# Patient Record
Sex: FEMALE | Race: WHITE | NOT HISPANIC OR LATINO | Employment: UNEMPLOYED | URBAN - METROPOLITAN AREA
[De-identification: names, ages, dates, MRNs, and addresses within clinical notes are randomized per-mention and may not be internally consistent; named-entity substitution may affect disease eponyms.]

---

## 2023-10-05 ENCOUNTER — PREP FOR PROCEDURE (OUTPATIENT)
Dept: SURGERY | Facility: HOSPITAL | Age: 38
End: 2023-10-05
Payer: COMMERCIAL

## 2023-10-05 DIAGNOSIS — Q35.9 CLEFT PALATE (HHS-HCC): Primary | ICD-10-CM

## 2023-10-05 DIAGNOSIS — K13.79 VELOPHARYNGEAL INSUFFICIENCY, ACQUIRED: ICD-10-CM

## 2023-10-18 ENCOUNTER — OFFICE VISIT (OUTPATIENT)
Dept: PLASTIC SURGERY | Facility: HOSPITAL | Age: 38
End: 2023-10-18
Payer: COMMERCIAL

## 2023-10-18 ENCOUNTER — MULTIDISCIPLINARY VISIT (OUTPATIENT)
Dept: SPEECH THERAPY | Facility: HOSPITAL | Age: 38
End: 2023-10-18
Payer: COMMERCIAL

## 2023-10-18 VITALS
HEIGHT: 64 IN | SYSTOLIC BLOOD PRESSURE: 140 MMHG | RESPIRATION RATE: 20 BRPM | HEART RATE: 57 BPM | TEMPERATURE: 98 F | WEIGHT: 218.92 LBS | BODY MASS INDEX: 37.37 KG/M2 | DIASTOLIC BLOOD PRESSURE: 85 MMHG

## 2023-10-18 DIAGNOSIS — R49.21 HYPERNASAL SPEECH: ICD-10-CM

## 2023-10-18 DIAGNOSIS — Q35.9 CLEFT PALATE (HHS-HCC): ICD-10-CM

## 2023-10-18 DIAGNOSIS — K13.79 VELOPHARYNGEAL INSUFFICIENCY, ACQUIRED: Primary | ICD-10-CM

## 2023-10-18 DIAGNOSIS — K13.79: Primary | ICD-10-CM

## 2023-10-18 PROCEDURE — 99213 OFFICE O/P EST LOW 20 MIN: CPT | Performed by: SURGERY

## 2023-10-18 PROCEDURE — 92524 BEHAVRAL QUALIT ANALYS VOICE: CPT | Mod: GN | Performed by: SPEECH-LANGUAGE PATHOLOGIST

## 2023-10-18 ASSESSMENT — PAIN SCALES - GENERAL: PAINLEVEL_OUTOF10: 0 - NO PAIN

## 2023-10-18 ASSESSMENT — PAIN - FUNCTIONAL ASSESSMENT: PAIN_FUNCTIONAL_ASSESSMENT: 0-10

## 2023-10-18 NOTE — Clinical Note
October 20, 2023     Patient: Sadie Bocanegra   YOB: 1985   Date of Visit: 10/18/2023       To Whom it May Concern:    Sadie Bocanegra was seen in my clinic on 10/18/2023. She {Return to school/sport:19545}.    If you have any questions or concerns, please don't hesitate to call.         Sincerely,          Tessa Bansal, CCC-SLP        CC: No Recipients

## 2023-10-18 NOTE — PROGRESS NOTES
Speech-Language Pathology    SLP ADULT Outpatient Speech-Language Cognition    Patient Name: Sadie Bocanegra  MRN: [id number]  Today's Date: 10/20/2023      Time Calculation  Start Time: 1345  Stop Time: 1405  Time Calculation (min): 20 min      Current Problem:   1. Acquired velopharyngeal incompetence        2. Hypernasal speech              SLP Assessment:  SLP Assessment  SLP Assessment Results: Other (Comment) (Pt with significant VPI receiving a VPI score of 9)  Strengths: Motivation  Education Provided: Yes      SLP Plan:  Plan  Discussed POC: Patient  Discussed Risks/Benefits: Yes, Patient  Patient/Caregiver Agreeable: Yes      Subjective   Pt is eager for surgery to provide at least some relief from her hypernasality.     Most Recent Visit:  SLP Most Recent Visit  SLP Received On: 10/18/23      General Visit Information:  General Information  Reason for Referral: Pt with acquired VPI with large soft palate defect.  Referred By: Dr Naylor  Past Medical History Relevant to Rehab: Pt is a 37 year old female with acquired VPI secondary to recent large soft palate defect.      Objective       Pain:  Pain Assessment  Pain Assessment: 0-10  Pain Score: 0 - No pain      Resonance:   Pt presented with a VPI score of 9 which is indicative of an incompetent valving mechanism. A score of 9 was given for significantly reduced intraoral air pressure, inconsistent audible nasal emissions, and moderate hypernasality. Discussed given the large size of her soft palate defect she may not get full resolution but likely will get some and is a great candidate given her speech is still intact. Pt interested in surgery and will be speaking with Dr Naylor after this visit. Recommend follow up with SLP after surgery.     Outpatient Education:  Adult Outpatient Education  Individual(s) Educated: Patient  Verbal Education : VPI, limitations of surgery, recommendations  Diagnosis and Precautions: Incompetent Valving Mechanism- Significant  VPI  Risk and Benefits Discussed with Patient/Caregiver/Other: yes  Patient/Caregiver Demonstrated Understanding: yes  Patient Response to Education: Patient/Caregiver Verbalized Understanding of Information, Patient/Caregiver Asked Appropriate Questions

## 2023-10-18 NOTE — PROGRESS NOTES
Clinic Note    Reason For Visit  Soft palate defect    History Of Present Illness  Sadie Bocanegra is a 37 y.o. female presenting with large soft palate defect and velopharyngeal insufficiency.  As seen this patient about 1 month ago she reports that this defect has been present for over 1 year.  She has been seen by multiple other providers and denies any inciting trauma.  She has not been able to work due to the significant hypernasality and inability for other people to understand her.     Past Medical History  She has no past medical history on file.  Anxiety, right hip degenerative disease, hyperlipidemia    Surgical History  She has no past surgical history on file.  Right hip replacement, bilateral carpal tunnel release     Social History  She reports that she has been smoking cigarettes. She has never used smokeless tobacco. No history on file for alcohol use and drug use.  She smokes half pack of cigarettes per day  She denies any alcohol use or illicit drug use other than marijuana  She is currently unemployed but used to work as a     Allergies  Gabapentin, Latex, and Toradol [ketorolac]    Review of Systems  Negative other than what is included in the HPI.      Physical Exam  On exam, Sadie Bocanegra is well-appearing and well-developed.  she is breathing comfortably on room air and is in no distress.  Focused examination of Her affected region reveals: Large soft palate defect and tissue loss.  Significant hypernasality and velopharyngeal insufficiency.     Relevant Results      Assessment/Plan     Sadie Bocanegra is a 37 y.o. female with large soft palate defect resulting hypernasality, nasal regurgitation.  This is very symptomatic especially in terms of her unintelligible speech.  This has precluded her from being able to work as a  and she is very interested in palatal reconstruction to improve her speech.  We went over the options for surgical repair and again emphasized  that we will need to to transfer tissue from the buccal region in order to reconstruct the soft palate.  I did discuss again discussed with her that she will likely still have some residual speech abnormalities afterwards requiring speech therapy and possible obturator.  Furthermore I discussed that she will need to stop smoking for at least 30 days prior to surgery to improve the chance of healing.  We will need to obtain a cotton test prior to surgery to confirm smoking cessation.    She was evaluated by our team speech-language pathologist today and found to have severe velopharyngeal insufficiency.  Given the limitation of her speech and difficulty with others understanding her, I do think that she is unable to work at this time.    I spent 20 minutes in the professional and overall care of this patient.      Matt Naylor MD

## 2023-10-18 NOTE — PATIENT INSTRUCTIONS
Schedule surgery    Stop smoking    CPM visit and preoperative labs    Call or return sooner if there are questions or concerns

## 2023-10-18 NOTE — Clinical Note
October 20, 2023     Patient: Sadie Bocanegra   YOB: 1985   Date of Visit: 10/18/2023       To Whom It May Concern:    It is my medical opinion that Sadie Bocanegra {Work release (duty restriction):65209}.    If you have any questions or concerns, please don't hesitate to call.         Sincerely,        Tessa Bansal, CCC-SLP    CC: No Recipients

## 2023-10-20 PROBLEM — R49.21 HYPERNASAL SPEECH: Status: ACTIVE | Noted: 2023-10-20

## 2023-10-20 PROBLEM — Q38.8 VELOPHARYNGEAL INSUFFICIENCY (VPI), CONGENITAL: Status: ACTIVE | Noted: 2023-10-20

## 2023-10-20 PROBLEM — K13.79: Status: ACTIVE | Noted: 2023-10-20

## 2023-10-23 DIAGNOSIS — R49.21 HYPERNASAL SPEECH: Primary | ICD-10-CM

## 2023-10-23 DIAGNOSIS — K13.79: ICD-10-CM

## 2023-10-23 PROBLEM — Q35.9 CLEFT PALATE (HHS-HCC): Status: ACTIVE | Noted: 2023-10-05

## 2023-10-30 DIAGNOSIS — K13.79 VELOPHARYNGEAL INSUFFICIENCY, ACQUIRED: ICD-10-CM

## 2023-10-30 DIAGNOSIS — Q35.9 CLEFT PALATE (HHS-HCC): ICD-10-CM

## 2023-10-30 DIAGNOSIS — R49.21 HYPERNASAL SPEECH: ICD-10-CM

## 2023-11-21 ENCOUNTER — PRE-ADMISSION TESTING (OUTPATIENT)
Dept: PREADMISSION TESTING | Facility: HOSPITAL | Age: 38
End: 2023-11-21
Payer: COMMERCIAL

## 2023-11-21 VITALS
SYSTOLIC BLOOD PRESSURE: 133 MMHG | OXYGEN SATURATION: 98 % | HEART RATE: 62 BPM | TEMPERATURE: 96.7 F | DIASTOLIC BLOOD PRESSURE: 84 MMHG | WEIGHT: 220.6 LBS | HEIGHT: 64 IN | BODY MASS INDEX: 37.66 KG/M2

## 2023-11-21 DIAGNOSIS — K13.79 VELOPHARYNGEAL INSUFFICIENCY, ACQUIRED: ICD-10-CM

## 2023-11-21 DIAGNOSIS — R49.21 HYPERNASAL SPEECH: ICD-10-CM

## 2023-11-21 DIAGNOSIS — Q35.9 CLEFT PALATE (HHS-HCC): ICD-10-CM

## 2023-11-21 LAB
ABO GROUP (TYPE) IN BLOOD: NORMAL
ALBUMIN SERPL BCP-MCNC: 4.4 G/DL (ref 3.4–5)
ALP SERPL-CCNC: 57 U/L (ref 33–110)
ALT SERPL W P-5'-P-CCNC: 5 U/L (ref 7–45)
ANION GAP SERPL CALC-SCNC: 13 MMOL/L (ref 10–20)
ANTIBODY SCREEN: NORMAL
APTT PPP: 30 SECONDS (ref 27–38)
AST SERPL W P-5'-P-CCNC: 9 U/L (ref 9–39)
BILIRUB SERPL-MCNC: 0.3 MG/DL (ref 0–1.2)
BUN SERPL-MCNC: 12 MG/DL (ref 6–23)
CALCIUM SERPL-MCNC: 9.3 MG/DL (ref 8.6–10.6)
CHLORIDE SERPL-SCNC: 105 MMOL/L (ref 98–107)
CO2 SERPL-SCNC: 26 MMOL/L (ref 21–32)
CREAT SERPL-MCNC: 0.69 MG/DL (ref 0.5–1.05)
ERYTHROCYTE [DISTWIDTH] IN BLOOD BY AUTOMATED COUNT: 14.3 % (ref 11.5–14.5)
GFR SERPL CREATININE-BSD FRML MDRD: >90 ML/MIN/1.73M*2
GLUCOSE SERPL-MCNC: 86 MG/DL (ref 74–99)
HCT VFR BLD AUTO: 38.1 % (ref 36–46)
HGB BLD-MCNC: 12.4 G/DL (ref 12–16)
INR PPP: 1 (ref 0.9–1.1)
MCH RBC QN AUTO: 30.1 PG (ref 26–34)
MCHC RBC AUTO-ENTMCNC: 32.5 G/DL (ref 32–36)
MCV RBC AUTO: 93 FL (ref 80–100)
NRBC BLD-RTO: 0 /100 WBCS (ref 0–0)
PLATELET # BLD AUTO: 270 X10*3/UL (ref 150–450)
POTASSIUM SERPL-SCNC: 4.2 MMOL/L (ref 3.5–5.3)
PROT SERPL-MCNC: 6.8 G/DL (ref 6.4–8.2)
PROTHROMBIN TIME: 11.1 SECONDS (ref 9.8–12.8)
RBC # BLD AUTO: 4.12 X10*6/UL (ref 4–5.2)
RH FACTOR (ANTIGEN D): NORMAL
SODIUM SERPL-SCNC: 140 MMOL/L (ref 136–145)
WBC # BLD AUTO: 9.4 X10*3/UL (ref 4.4–11.3)

## 2023-11-21 PROCEDURE — 36415 COLL VENOUS BLD VENIPUNCTURE: CPT

## 2023-11-21 PROCEDURE — 85610 PROTHROMBIN TIME: CPT

## 2023-11-21 PROCEDURE — 85730 THROMBOPLASTIN TIME PARTIAL: CPT

## 2023-11-21 PROCEDURE — 86900 BLOOD TYPING SEROLOGIC ABO: CPT

## 2023-11-21 PROCEDURE — 80053 COMPREHEN METABOLIC PANEL: CPT

## 2023-11-21 PROCEDURE — 99204 OFFICE O/P NEW MOD 45 MIN: CPT | Performed by: NURSE PRACTITIONER

## 2023-11-21 PROCEDURE — 80323 ALKALOIDS NOS: CPT

## 2023-11-21 PROCEDURE — 85027 COMPLETE CBC AUTOMATED: CPT

## 2023-11-21 RX ORDER — NORETHINDRONE ACETATE AND ETHINYL ESTRADIOL .02; 1 MG/1; MG/1
1 TABLET ORAL DAILY
Status: ON HOLD | COMMUNITY
Start: 2023-04-13 | End: 2023-12-01 | Stop reason: DRUGHIGH

## 2023-11-21 RX ORDER — METHADONE HYDROCHLORIDE 5 MG/1
5 TABLET ORAL 4 TIMES DAILY
COMMUNITY

## 2023-11-21 RX ORDER — OXYCODONE AND ACETAMINOPHEN 5; 325 MG/1; MG/1
1 TABLET ORAL 4 TIMES DAILY
COMMUNITY

## 2023-11-21 RX ORDER — VILAZODONE HYDROCHLORIDE 40 MG/1
40 TABLET ORAL DAILY
COMMUNITY

## 2023-11-21 RX ORDER — ZOLPIDEM TARTRATE 5 MG/1
5 TABLET ORAL NIGHTLY PRN
COMMUNITY

## 2023-11-21 RX ORDER — ALPRAZOLAM 1 MG/1
1 TABLET ORAL NIGHTLY PRN
Status: ON HOLD | COMMUNITY
End: 2023-12-01 | Stop reason: DRUGHIGH

## 2023-11-21 RX ORDER — VALACYCLOVIR HYDROCHLORIDE 1 G/1
1000 TABLET, FILM COATED ORAL DAILY
COMMUNITY

## 2023-11-21 RX ORDER — CLONAZEPAM 1 MG/1
1 TABLET ORAL 3 TIMES DAILY
COMMUNITY

## 2023-11-21 RX ORDER — ATORVASTATIN CALCIUM 10 MG/1
10 TABLET, FILM COATED ORAL NIGHTLY
COMMUNITY

## 2023-11-21 ASSESSMENT — ENCOUNTER SYMPTOMS
NEUROLOGICAL NEGATIVE: 1
CONSTITUTIONAL NEGATIVE: 1
TROUBLE SWALLOWING: 1
MUSCULOSKELETAL NEGATIVE: 1
GASTROINTESTINAL NEGATIVE: 1
RESPIRATORY NEGATIVE: 1
VOICE CHANGE: 1
EYES NEGATIVE: 1
CARDIOVASCULAR NEGATIVE: 1
ENDOCRINE NEGATIVE: 1

## 2023-11-21 ASSESSMENT — DUKE ACTIVITY SCORE INDEX (DASI)
CAN YOU DO MODERATE WORK AROUND THE HOUSE LIKE VACUUMING, SWEEPING FLOORS OR CARRYING GROCERIES: YES
CAN YOU WALK A BLOCK OR TWO ON LEVEL GROUND: YES
CAN YOU RUN A SHORT DISTANCE: YES
CAN YOU PARTICIPATE IN MODERATE RECREATIONAL ACTIVITIES LIKE GOLF, BOWLING, DANCING, DOUBLES TENNIS OR THROWING A BASEBALL OR FOOTBALL: YES
CAN YOU HAVE SEXUAL RELATIONS: YES
DASI METS SCORE: 9.9
CAN YOU DO YARD WORK LIKE RAKING LEAVES, WEEDING OR PUSHING A MOWER: YES
CAN YOU PARTICIPATE IN STRENOUS SPORTS LIKE SWIMMING, SINGLES TENNIS, FOOTBALL, BASKETBALL, OR SKIING: YES
TOTAL_SCORE: 58.2
CAN YOU WALK INDOORS, SUCH AS AROUND YOUR HOUSE: YES
CAN YOU TAKE CARE OF YOURSELF (EAT, DRESS, BATHE, OR USE TOILET): YES
CAN YOU CLIMB A FLIGHT OF STAIRS OR WALK UP A HILL: YES
CAN YOU DO LIGHT WORK AROUND THE HOUSE LIKE DUSTING OR WASHING DISHES: YES
CAN YOU DO HEAVY WORK AROUND THE HOUSE LIKE SCRUBBING FLOORS OR LIFTING AND MOVING HEAVY FURNITURE: YES

## 2023-11-21 ASSESSMENT — LIFESTYLE VARIABLES: SMOKING_STATUS: NONSMOKER

## 2023-11-21 NOTE — CPM/PAT H&P
CPM/PAT Evaluation       Name: Sadie Bocanegra (Sadie Bocanegra)  /Age: 12/15//37 y.o.     Visit Type:   In-Person       Chief Complaint: cleft palate scheduled for surgery    HPI: Patient is a 37-year-old female scheduled for repair of cleft palate on 2023.  The patient is referred by Dr. Matt Naylor for preoperative evaluation of anxiety, depression, herpes managed on Valtrex, hyperlipidemia managed on atorvastatin, velopharyngeal insufficiency acquired, cleft palate, osteoarthritis status post left hip arthroplasty.    Past Medical History:   Diagnosis Date    Anxiety     Cleft palate     Herpes     Hyperlipidemia     Velopharyngeal insufficiency, acquired        Past Surgical History:   Procedure Laterality Date    CARPAL TUNNEL RELEASE Bilateral 2019    CERVICAL BIOPSY      HIP ARTHROPLASTY Left 2022    LYMPH NODE BIOPSY      left axillary - found to be blocked sweat gland       Patient  has no history on file for sexual activity.    Family History   Problem Relation Name Age of Onset    No Known Problems Mother      Lung cancer Maternal Grandmother      Brain cancer Maternal Grandmother      Uterine cancer Maternal Grandmother      Lung cancer Maternal Grandfather         Allergies   Allergen Reactions    Gabapentin GI Upset    Latex Other    Toradol [Ketorolac] GI Upset       Prior to Admission medications    Medication Sig Start Date End Date Taking? Authorizing Provider   norethindrone ac-eth estradioL (Microgestin ) 1-20 mg-mcg tablet Take 1 tablet by mouth once daily. 23  Yes Historical Provider, MD   ALPRAZolam (Xanax) 1 mg tablet Take 1 tablet (1 mg) by mouth as needed at bedtime for anxiety.    Historical Provider, MD   atorvastatin (Lipitor) 10 mg tablet Take 1 tablet (10 mg) by mouth once daily at bedtime.    Historical Provider, MD   clonazePAM (KlonoPIN) 1 mg tablet Take 1 tablet (1 mg) by mouth 3 times a day.    Historical Provider, MD   methadone (Dolophine) 5 mg  tablet Take 1 tablet (5 mg) by mouth 4 times a day.    Historical Provider, MD   oxyCODONE-acetaminophen (Percocet) 5-325 mg tablet Take 1 tablet by mouth 4 times a day.    Historical Provider, MD   valACYclovir (Valtrex) 1 gram tablet Take 1 tablet (1,000 mg) by mouth once daily.    Historical Provider, MD   vilazodone (Viibryd) 40 mg tablet Take 1 tablet (40 mg) by mouth once daily.    Historical Provider, MD   zolpidem (Ambien) 5 mg tablet Take 1 tablet (5 mg) by mouth as needed at bedtime for sleep.    Historical Provider, MD JACKSON ROS:   Constitutional:   neg    Neuro/Psych:   neg    Eyes:   neg     use of corrective lenses  Ears:   Nose:   neg    Mouth:   neg    Throat:    dysphagia   voice change  Neck:   Cardio:   neg    Respiratory:   neg    Endocrine:   neg    GI:   neg    :   neg    Musculoskeletal:   neg    Hematologic:   neg    Skin:  neg        Physical Exam  Vitals reviewed.   Constitutional:       Appearance: Normal appearance. She is obese.   HENT:      Head: Normocephalic.      Mouth/Throat:      Mouth: Mucous membranes are dry.   Eyes:      Conjunctiva/sclera: Conjunctivae normal.   Neck:      Vascular: No carotid bruit.   Cardiovascular:      Rate and Rhythm: Normal rate and regular rhythm.      Pulses: Normal pulses.      Heart sounds: Normal heart sounds.   Pulmonary:      Effort: Pulmonary effort is normal.      Breath sounds: Normal breath sounds.   Abdominal:      Palpations: Abdomen is soft.      Tenderness: There is no abdominal tenderness.   Musculoskeletal:         General: Normal range of motion.      Cervical back: Normal range of motion.      Right lower leg: No edema.      Left lower leg: No edema.   Lymphadenopathy:      Cervical: No cervical adenopathy.   Skin:     General: Skin is warm and dry.      Capillary Refill: Capillary refill takes less than 2 seconds.   Neurological:      General: No focal deficit present.      Mental Status: She is alert and oriented to person,  place, and time.   Psychiatric:         Mood and Affect: Mood normal.         Behavior: Behavior normal.         Thought Content: Thought content normal.         Judgment: Judgment normal.          PAT AIRWAY:   Airway:     Mallampati::  II    Neck ROM::  Full  normal        Visit Vitals  /84   Pulse 62   Temp 35.9 °C (96.7 °F)       DASI Risk Score      Flowsheet Row Most Recent Value   DASI SCORE 58.2   METS Score (Will be calculated only when all the questions are answered) 9.9          Caprini DVT Assessment      Flowsheet Row Most Recent Value   DVT Score 10   Current Status Major surgery planned, lasting over 3 hours   Women Oral contraceptives   History Prior major surgery   Age Less than 40 years   BMI 31-40 (Obesity)          Modified Frailty Index      Flowsheet Row Most Recent Value   Modified Frailty Index Calculator 0          CHADS2 Stroke Risk  Current as of 3 minutes ago        N/A 3 - 100%: High Risk   2 - 3%: Medium Risk   0 - 2%: Low Risk     Last Change: N/A          This score determines the patient's risk of having a stroke if the patient has atrial fibrillation.        This score is not applicable to this patient. Components are not calculated.          Revised Cardiac Risk Index      Flowsheet Row Most Recent Value   Revised Cardiac Risk Calculator 0          Apfel Simplified Score      Flowsheet Row Most Recent Value   Apfel Simplified Score Calculator 3          Risk Analysis Index Results This Encounter    No data found in the last 1 encounters.       Stop Bang Score      Flowsheet Row Most Recent Value   Do you snore loudly? 1   Do you often feel tired or fatigued after your sleep? 1   Has anyone ever observed you stop breathing in your sleep? 0   Do you have or are you being treated for high blood pressure? 0   Recent BMI (Calculated) 37.1   Is BMI greater than 35 kg/m2? 1=Yes   Age older than 50 years old? 0=No   Is your neck circumference greater than 17 inches (Male) or 16  inches (Female)? 0   Gender - Male 0=No   STOP-BANG Total Score 3            Assessment and Plan:   Neuro:  anxiety and depression managed with medications and non-medicinal therapies. The patient is at an increased risk for post operative delirium secondary to depression and type and duration of surgery.  Preoperative brain exercise educational handout provided to patient.    The patient is at an increased risk for perioperative stroke secondary to female sex and general anesthesia with op time >2.5 hours.     HEENT/Airway:  velopharyngeal insufficiency acquired, cleft palate scheduled for surgery    Cardiovascular:  hyperlipidemia managed on atorvastatin. No additional preoperative testing is currently indicated.    METS are 9.9    RCRI  0 which is 3.9% 30 day risk of MACE (risk for cardiac death, nonfatal myocardial infarction, and nonfactal cardiac arrest    LIZ score which indicates a 0.1% risk of intraoperative or 30-day postoperative MACE      Pulmonary:  recent cessation of tobacco abuse.  Preoperative deep breathing exercise educational handout provided to patient.    ARISCAT:  16  points which is a  low risk of in-hospital post-op pulmonary complications     PRODIGY:  0  points which is a low  risk of post op opioid induced respiratory depression episodes    STOP BANG:  3 points which is an intermediate risk for moderate to severe AMBROCIO. Patient to speak to PCP for work up and possible referral closer to home post op.    Renal: No diagnosis or significant findings on chart review or clinical presentation and evaluation.     Endocrine:  No diagnosis or significant findings on chart review or clinical presentation and evaluation.     Hematologic:   No diagnosis or significant findings on chart review or clinical presentation and evaluation.  Preoperative DVT educational handout provided to patient.    Caprini Score:   10 points which is a highest risk of perioperative VTE    Gastrointestinal:   Dysphagia  related to cleft palate    EAT-10 score of 40 - self-perceived oropharyngeal dysphagia scale (0-40)     Apfel:  3  points 61% risk for post operative N/V    Infectious disease:  herpes managed on Valtrex with no recent outbreaks.     Musculoskeletal:  osteoarthritis status post left hip arthroplasty       Labs ordered  Results for orders placed or performed in visit on 11/21/23 (from the past 96 hour(s))   Type And Screen   Result Value Ref Range    ABO TYPE A     Rh TYPE POS     ANTIBODY SCREEN NEG    Coagulation Screen   Result Value Ref Range    Protime 11.1 9.8 - 12.8 seconds    INR 1.0 0.9 - 1.1    aPTT 30 27 - 38 seconds   CBC   Result Value Ref Range    WBC 9.4 4.4 - 11.3 x10*3/uL    nRBC 0.0 0.0 - 0.0 /100 WBCs    RBC 4.12 4.00 - 5.20 x10*6/uL    Hemoglobin 12.4 12.0 - 16.0 g/dL    Hematocrit 38.1 36.0 - 46.0 %    MCV 93 80 - 100 fL    MCH 30.1 26.0 - 34.0 pg    MCHC 32.5 32.0 - 36.0 g/dL    RDW 14.3 11.5 - 14.5 %    Platelets 270 150 - 450 x10*3/uL   Comprehensive Metabolic Panel   Result Value Ref Range    Glucose 86 74 - 99 mg/dL    Sodium 140 136 - 145 mmol/L    Potassium 4.2 3.5 - 5.3 mmol/L    Chloride 105 98 - 107 mmol/L    Bicarbonate 26 21 - 32 mmol/L    Anion Gap 13 10 - 20 mmol/L    Urea Nitrogen 12 6 - 23 mg/dL    Creatinine 0.69 0.50 - 1.05 mg/dL    eGFR >90 >60 mL/min/1.73m*2    Calcium 9.3 8.6 - 10.6 mg/dL    Albumin 4.4 3.4 - 5.0 g/dL    Alkaline Phosphatase 57 33 - 110 U/L    Total Protein 6.8 6.4 - 8.2 g/dL    AST 9 9 - 39 U/L    Bilirubin, Total 0.3 0.0 - 1.2 mg/dL    ALT 5 (L) 7 - 45 U/L      serum nicotine- 11/21/23 negative

## 2023-11-21 NOTE — PREPROCEDURE INSTRUCTIONS
NPO Instructions:    Do not eat any food after midnight the night before your surgery/procedure.  You may have 10 ounces of clear liquids until TWO hours before surgery/procedure. This includes water, black tea/coffee, (no milk or cream) apple juice and electrolyte drinks (Gatorade).  You may chew gum up to TWO hours before your surgery/procedure.    Additional Instructions:     The Day before Surgery:  Review your medication instructions, stop indicated medications  You will be contacted in the evening regarding the time of your arrival to facility and surgery time    Day of Surgery:  Review your medication instructions, take indicated medications  Wear  comfortable loose fitting clothing  Do not use moisturizers, creams, lotions or perfume  All jewelry and valuables should be left at home    Samantha Meeson, MSN, NP-C  Adult-Gerontology Nurse Practitioner II  Department of Anesthesiology and Perioperative Medicine  Main phone [phone]  Direct phone [phone]  Fax [phone]

## 2023-11-25 LAB
ANABASINE UR-MCNC: <5 NG/ML
COTININE UR-MCNC: <15 NG/ML
NICOTINE UR-MCNC: <15 NG/ML
TRANS-3-OH-COTININE UR-MCNC: <50 NG/ML

## 2023-12-01 ENCOUNTER — ANESTHESIA EVENT (OUTPATIENT)
Dept: OPERATING ROOM | Facility: HOSPITAL | Age: 38
End: 2023-12-01
Payer: COMMERCIAL

## 2023-12-01 ENCOUNTER — HOSPITAL ENCOUNTER (OUTPATIENT)
Facility: HOSPITAL | Age: 38
Discharge: HOME | End: 2023-12-02
Attending: SURGERY | Admitting: SURGERY
Payer: COMMERCIAL

## 2023-12-01 ENCOUNTER — ANESTHESIA (OUTPATIENT)
Dept: OPERATING ROOM | Facility: HOSPITAL | Age: 38
End: 2023-12-01
Payer: COMMERCIAL

## 2023-12-01 DIAGNOSIS — Q35.9 CLEFT PALATE (HHS-HCC): Primary | ICD-10-CM

## 2023-12-01 DIAGNOSIS — K13.79 VELOPHARYNGEAL INSUFFICIENCY, ACQUIRED: ICD-10-CM

## 2023-12-01 PROBLEM — F41.9 ANXIETY: Status: ACTIVE | Noted: 2023-12-01

## 2023-12-01 LAB
ABO GROUP (TYPE) IN BLOOD: NORMAL
ERYTHROCYTE [DISTWIDTH] IN BLOOD BY AUTOMATED COUNT: 13.8 % (ref 11.5–14.5)
HCT VFR BLD AUTO: 39.6 % (ref 36–46)
HGB BLD-MCNC: 13.1 G/DL (ref 12–16)
MCH RBC QN AUTO: 29.9 PG (ref 26–34)
MCHC RBC AUTO-ENTMCNC: 33.1 G/DL (ref 32–36)
MCV RBC AUTO: 90 FL (ref 80–100)
NRBC BLD-RTO: 0 /100 WBCS (ref 0–0)
PLATELET # BLD AUTO: 290 X10*3/UL (ref 150–450)
PREGNANCY TEST URINE, POC: NEGATIVE
RBC # BLD AUTO: 4.38 X10*6/UL (ref 4–5.2)
RH FACTOR (ANTIGEN D): NORMAL
WBC # BLD AUTO: 19 X10*3/UL (ref 4.4–11.3)

## 2023-12-01 PROCEDURE — 85027 COMPLETE CBC AUTOMATED: CPT | Performed by: PHYSICIAN ASSISTANT

## 2023-12-01 PROCEDURE — 3700000001 HC GENERAL ANESTHESIA TIME - INITIAL BASE CHARGE: Performed by: SURGERY

## 2023-12-01 PROCEDURE — A4217 STERILE WATER/SALINE, 500 ML: HCPCS | Mod: SE | Performed by: SURGERY

## 2023-12-01 PROCEDURE — 2500000002 HC RX 250 W HCPCS SELF ADMINISTERED DRUGS (ALT 637 FOR MEDICARE OP, ALT 636 FOR OP/ED): Mod: SE | Performed by: STUDENT IN AN ORGANIZED HEALTH CARE EDUCATION/TRAINING PROGRAM

## 2023-12-01 PROCEDURE — 42200 RECONSTRUCT CLEFT PALATE: CPT | Performed by: SURGERY

## 2023-12-01 PROCEDURE — 2500000004 HC RX 250 GENERAL PHARMACY W/ HCPCS (ALT 636 FOR OP/ED): Mod: SE

## 2023-12-01 PROCEDURE — 2500000001 HC RX 250 WO HCPCS SELF ADMINISTERED DRUGS (ALT 637 FOR MEDICARE OP): Mod: SE | Performed by: STUDENT IN AN ORGANIZED HEALTH CARE EDUCATION/TRAINING PROGRAM

## 2023-12-01 PROCEDURE — 81025 URINE PREGNANCY TEST: CPT | Performed by: SURGERY

## 2023-12-01 PROCEDURE — 36415 COLL VENOUS BLD VENIPUNCTURE: CPT | Performed by: PHYSICIAN ASSISTANT

## 2023-12-01 PROCEDURE — 42200 RECONSTRUCT CLEFT PALATE: CPT | Performed by: STUDENT IN AN ORGANIZED HEALTH CARE EDUCATION/TRAINING PROGRAM

## 2023-12-01 PROCEDURE — 7100000001 HC RECOVERY ROOM TIME - INITIAL BASE CHARGE: Performed by: SURGERY

## 2023-12-01 PROCEDURE — 2500000005 HC RX 250 GENERAL PHARMACY W/O HCPCS: Mod: SE

## 2023-12-01 PROCEDURE — 7100000011 HC EXTENDED STAY RECOVERY HOURLY - NURSING UNIT

## 2023-12-01 PROCEDURE — 3700000002 HC GENERAL ANESTHESIA TIME - EACH INCREMENTAL 1 MINUTE: Performed by: SURGERY

## 2023-12-01 PROCEDURE — 2500000004 HC RX 250 GENERAL PHARMACY W/ HCPCS (ALT 636 FOR OP/ED): Mod: SE | Performed by: SURGERY

## 2023-12-01 PROCEDURE — 3600000003 HC OR TIME - INITIAL BASE CHARGE - PROCEDURE LEVEL THREE: Performed by: SURGERY

## 2023-12-01 PROCEDURE — 2500000005 HC RX 250 GENERAL PHARMACY W/O HCPCS: Mod: SE | Performed by: SURGERY

## 2023-12-01 PROCEDURE — 7100000002 HC RECOVERY ROOM TIME - EACH INCREMENTAL 1 MINUTE: Performed by: SURGERY

## 2023-12-01 PROCEDURE — 2500000001 HC RX 250 WO HCPCS SELF ADMINISTERED DRUGS (ALT 637 FOR MEDICARE OP): Mod: SE | Performed by: SURGERY

## 2023-12-01 PROCEDURE — 2500000004 HC RX 250 GENERAL PHARMACY W/ HCPCS (ALT 636 FOR OP/ED): Mod: SE | Performed by: STUDENT IN AN ORGANIZED HEALTH CARE EDUCATION/TRAINING PROGRAM

## 2023-12-01 PROCEDURE — S0109 METHADONE ORAL 5MG: HCPCS | Mod: SE | Performed by: STUDENT IN AN ORGANIZED HEALTH CARE EDUCATION/TRAINING PROGRAM

## 2023-12-01 PROCEDURE — 3600000008 HC OR TIME - EACH INCREMENTAL 1 MINUTE - PROCEDURE LEVEL THREE: Performed by: SURGERY

## 2023-12-01 PROCEDURE — 15733 MUSC MYOQ/FSCQ FLP H&N PEDCL: CPT | Performed by: SURGERY

## 2023-12-01 PROCEDURE — 96372 THER/PROPH/DIAG INJ SC/IM: CPT | Performed by: STUDENT IN AN ORGANIZED HEALTH CARE EDUCATION/TRAINING PROGRAM

## 2023-12-01 PROCEDURE — A42210 PERIPHERAL IV: Performed by: STUDENT IN AN ORGANIZED HEALTH CARE EDUCATION/TRAINING PROGRAM

## 2023-12-01 PROCEDURE — 2780000003 HC OR 278 NO HCPCS: Performed by: SURGERY

## 2023-12-01 PROCEDURE — 36415 COLL VENOUS BLD VENIPUNCTURE: CPT | Performed by: STUDENT IN AN ORGANIZED HEALTH CARE EDUCATION/TRAINING PROGRAM

## 2023-12-01 RX ORDER — SODIUM CHLORIDE 0.9 G/100ML
IRRIGANT IRRIGATION AS NEEDED
Status: DISCONTINUED | OUTPATIENT
Start: 2023-12-01 | End: 2023-12-01 | Stop reason: HOSPADM

## 2023-12-01 RX ORDER — DEXTROSE MONOHYDRATE AND SODIUM CHLORIDE 5; .45 G/100ML; G/100ML
75 INJECTION, SOLUTION INTRAVENOUS CONTINUOUS
Status: DISCONTINUED | OUTPATIENT
Start: 2023-12-01 | End: 2023-12-02 | Stop reason: HOSPADM

## 2023-12-01 RX ORDER — DEXAMETHASONE SODIUM PHOSPHATE 100 MG/10ML
4 INJECTION INTRAMUSCULAR; INTRAVENOUS EVERY 8 HOURS
Status: COMPLETED | OUTPATIENT
Start: 2023-12-01 | End: 2023-12-02

## 2023-12-01 RX ORDER — IBUPROFEN 400 MG/1
400 TABLET ORAL EVERY 6 HOURS SCHEDULED
Status: DISCONTINUED | OUTPATIENT
Start: 2023-12-01 | End: 2023-12-02 | Stop reason: HOSPADM

## 2023-12-01 RX ORDER — PROPOFOL 10 MG/ML
INJECTION, EMULSION INTRAVENOUS AS NEEDED
Status: DISCONTINUED | OUTPATIENT
Start: 2023-12-01 | End: 2023-12-01

## 2023-12-01 RX ORDER — DEXAMETHASONE SODIUM PHOSPHATE 4 MG/ML
INJECTION, SOLUTION INTRA-ARTICULAR; INTRALESIONAL; INTRAMUSCULAR; INTRAVENOUS; SOFT TISSUE AS NEEDED
Status: DISCONTINUED | OUTPATIENT
Start: 2023-12-01 | End: 2023-12-01

## 2023-12-01 RX ORDER — OXYCODONE HYDROCHLORIDE 5 MG/1
10 TABLET ORAL EVERY 6 HOURS PRN
Status: DISCONTINUED | OUTPATIENT
Start: 2023-12-01 | End: 2023-12-02 | Stop reason: HOSPADM

## 2023-12-01 RX ORDER — METHADONE HYDROCHLORIDE 5 MG/1
5 TABLET ORAL 4 TIMES DAILY
Status: DISCONTINUED | OUTPATIENT
Start: 2023-12-01 | End: 2023-12-02 | Stop reason: HOSPADM

## 2023-12-01 RX ORDER — LIDOCAINE HYDROCHLORIDE 10 MG/ML
0.1 INJECTION INFILTRATION; PERINEURAL ONCE
Status: DISCONTINUED | OUTPATIENT
Start: 2023-12-01 | End: 2023-12-01 | Stop reason: HOSPADM

## 2023-12-01 RX ORDER — HYDROMORPHONE HYDROCHLORIDE 1 MG/ML
0.2 INJECTION, SOLUTION INTRAMUSCULAR; INTRAVENOUS; SUBCUTANEOUS EVERY 5 MIN PRN
Status: DISCONTINUED | OUTPATIENT
Start: 2023-12-01 | End: 2023-12-01 | Stop reason: HOSPADM

## 2023-12-01 RX ORDER — ACETAMINOPHEN 10 MG/ML
650 INJECTION, SOLUTION INTRAVENOUS EVERY 6 HOURS SCHEDULED
Status: DISCONTINUED | OUTPATIENT
Start: 2023-12-01 | End: 2023-12-01

## 2023-12-01 RX ORDER — BACITRACIN 500 [USP'U]/G
OINTMENT TOPICAL AS NEEDED
Status: DISCONTINUED | OUTPATIENT
Start: 2023-12-01 | End: 2023-12-01 | Stop reason: HOSPADM

## 2023-12-01 RX ORDER — ONDANSETRON HYDROCHLORIDE 2 MG/ML
INJECTION, SOLUTION INTRAVENOUS AS NEEDED
Status: DISCONTINUED | OUTPATIENT
Start: 2023-12-01 | End: 2023-12-01

## 2023-12-01 RX ORDER — ACETAMINOPHEN 325 MG/1
650 TABLET ORAL EVERY 6 HOURS
Status: DISCONTINUED | OUTPATIENT
Start: 2023-12-01 | End: 2023-12-02 | Stop reason: HOSPADM

## 2023-12-01 RX ORDER — POLYETHYLENE GLYCOL 3350 17 G/17G
17 POWDER, FOR SOLUTION ORAL DAILY
Status: DISCONTINUED | OUTPATIENT
Start: 2023-12-01 | End: 2023-12-02 | Stop reason: HOSPADM

## 2023-12-01 RX ORDER — HYDROMORPHONE HYDROCHLORIDE 1 MG/ML
INJECTION, SOLUTION INTRAMUSCULAR; INTRAVENOUS; SUBCUTANEOUS AS NEEDED
Status: DISCONTINUED | OUTPATIENT
Start: 2023-12-01 | End: 2023-12-01

## 2023-12-01 RX ORDER — NALOXONE HYDROCHLORIDE 0.4 MG/ML
0.2 INJECTION, SOLUTION INTRAMUSCULAR; INTRAVENOUS; SUBCUTANEOUS EVERY 5 MIN PRN
Status: DISCONTINUED | OUTPATIENT
Start: 2023-12-01 | End: 2023-12-02 | Stop reason: HOSPADM

## 2023-12-01 RX ORDER — DROPERIDOL 2.5 MG/ML
0.62 INJECTION, SOLUTION INTRAMUSCULAR; INTRAVENOUS ONCE AS NEEDED
Status: DISCONTINUED | OUTPATIENT
Start: 2023-12-01 | End: 2023-12-01 | Stop reason: HOSPADM

## 2023-12-01 RX ORDER — HYDROMORPHONE HYDROCHLORIDE 1 MG/ML
0.2 INJECTION, SOLUTION INTRAMUSCULAR; INTRAVENOUS; SUBCUTANEOUS EVERY 4 HOURS PRN
Status: DISCONTINUED | OUTPATIENT
Start: 2023-12-01 | End: 2023-12-02 | Stop reason: HOSPADM

## 2023-12-01 RX ORDER — VALACYCLOVIR HYDROCHLORIDE 500 MG/1
1000 TABLET, FILM COATED ORAL DAILY
Status: DISCONTINUED | OUTPATIENT
Start: 2023-12-02 | End: 2023-12-02 | Stop reason: HOSPADM

## 2023-12-01 RX ORDER — OXYCODONE HYDROCHLORIDE 5 MG/1
5 TABLET ORAL EVERY 4 HOURS PRN
Status: DISCONTINUED | OUTPATIENT
Start: 2023-12-01 | End: 2023-12-02 | Stop reason: HOSPADM

## 2023-12-01 RX ORDER — ROCURONIUM BROMIDE 10 MG/ML
INJECTION, SOLUTION INTRAVENOUS AS NEEDED
Status: DISCONTINUED | OUTPATIENT
Start: 2023-12-01 | End: 2023-12-01

## 2023-12-01 RX ORDER — CHLORHEXIDINE GLUCONATE ORAL RINSE 1.2 MG/ML
15 SOLUTION DENTAL 3 TIMES DAILY
Status: DISCONTINUED | OUTPATIENT
Start: 2023-12-01 | End: 2023-12-02 | Stop reason: HOSPADM

## 2023-12-01 RX ORDER — NORETHINDRONE AND ETHINYL ESTRADIOL 0.5-0.035
1 KIT ORAL DAILY
COMMUNITY

## 2023-12-01 RX ORDER — BUPIVACAINE HCL/EPINEPHRINE 0.25-.0005
VIAL (ML) INJECTION AS NEEDED
Status: DISCONTINUED | OUTPATIENT
Start: 2023-12-01 | End: 2023-12-01 | Stop reason: HOSPADM

## 2023-12-01 RX ORDER — FENTANYL CITRATE 50 UG/ML
INJECTION, SOLUTION INTRAMUSCULAR; INTRAVENOUS AS NEEDED
Status: DISCONTINUED | OUTPATIENT
Start: 2023-12-01 | End: 2023-12-01

## 2023-12-01 RX ORDER — HYDROMORPHONE HYDROCHLORIDE 1 MG/ML
0.5 INJECTION, SOLUTION INTRAMUSCULAR; INTRAVENOUS; SUBCUTANEOUS EVERY 5 MIN PRN
Status: DISCONTINUED | OUTPATIENT
Start: 2023-12-01 | End: 2023-12-01 | Stop reason: HOSPADM

## 2023-12-01 RX ORDER — AMPICILLIN AND SULBACTAM 2; 1 G/1; G/1
INJECTION, POWDER, FOR SOLUTION INTRAMUSCULAR; INTRAVENOUS AS NEEDED
Status: DISCONTINUED | OUTPATIENT
Start: 2023-12-01 | End: 2023-12-01

## 2023-12-01 RX ORDER — SODIUM CHLORIDE, SODIUM LACTATE, POTASSIUM CHLORIDE, CALCIUM CHLORIDE 600; 310; 30; 20 MG/100ML; MG/100ML; MG/100ML; MG/100ML
100 INJECTION, SOLUTION INTRAVENOUS CONTINUOUS
Status: DISCONTINUED | OUTPATIENT
Start: 2023-12-01 | End: 2023-12-01 | Stop reason: HOSPADM

## 2023-12-01 RX ORDER — ALPRAZOLAM 1 MG/1
1 TABLET ORAL EVERY 6 HOURS PRN
COMMUNITY

## 2023-12-01 RX ORDER — ATORVASTATIN CALCIUM 10 MG/1
10 TABLET, FILM COATED ORAL NIGHTLY
Status: DISCONTINUED | OUTPATIENT
Start: 2023-12-01 | End: 2023-12-02 | Stop reason: HOSPADM

## 2023-12-01 RX ORDER — MIDAZOLAM HYDROCHLORIDE 1 MG/ML
INJECTION INTRAMUSCULAR; INTRAVENOUS AS NEEDED
Status: DISCONTINUED | OUTPATIENT
Start: 2023-12-01 | End: 2023-12-01

## 2023-12-01 RX ORDER — ONDANSETRON 4 MG/1
4 TABLET, FILM COATED ORAL EVERY 8 HOURS PRN
Status: CANCELLED | OUTPATIENT
Start: 2023-12-01

## 2023-12-01 RX ORDER — ESMOLOL HYDROCHLORIDE 10 MG/ML
INJECTION INTRAVENOUS AS NEEDED
Status: DISCONTINUED | OUTPATIENT
Start: 2023-12-01 | End: 2023-12-01

## 2023-12-01 RX ORDER — FENTANYL CITRATE 50 UG/ML
12.5 INJECTION, SOLUTION INTRAMUSCULAR; INTRAVENOUS EVERY 5 MIN PRN
Status: DISCONTINUED | OUTPATIENT
Start: 2023-12-01 | End: 2023-12-01 | Stop reason: HOSPADM

## 2023-12-01 RX ORDER — VILAZODONE HYDROCHLORIDE 40 MG/1
40 TABLET ORAL DAILY
Status: DISCONTINUED | OUTPATIENT
Start: 2023-12-01 | End: 2023-12-02 | Stop reason: HOSPADM

## 2023-12-01 RX ORDER — ENOXAPARIN SODIUM 100 MG/ML
40 INJECTION SUBCUTANEOUS EVERY 24 HOURS
Status: DISCONTINUED | OUTPATIENT
Start: 2023-12-01 | End: 2023-12-02 | Stop reason: HOSPADM

## 2023-12-01 RX ORDER — CHLORHEXIDINE GLUCONATE ORAL RINSE 1.2 MG/ML
SOLUTION DENTAL AS NEEDED
Status: DISCONTINUED | OUTPATIENT
Start: 2023-12-01 | End: 2023-12-01 | Stop reason: HOSPADM

## 2023-12-01 RX ORDER — ONDANSETRON HYDROCHLORIDE 2 MG/ML
4 INJECTION, SOLUTION INTRAVENOUS EVERY 8 HOURS PRN
Status: CANCELLED | OUTPATIENT
Start: 2023-12-01

## 2023-12-01 RX ORDER — LIDOCAINE HCL/PF 100 MG/5ML
SYRINGE (ML) INTRAVENOUS AS NEEDED
Status: DISCONTINUED | OUTPATIENT
Start: 2023-12-01 | End: 2023-12-01

## 2023-12-01 RX ORDER — PHENYLEPHRINE HCL IN 0.9% NACL 0.4MG/10ML
SYRINGE (ML) INTRAVENOUS AS NEEDED
Status: DISCONTINUED | OUTPATIENT
Start: 2023-12-01 | End: 2023-12-01

## 2023-12-01 RX ORDER — LABETALOL HYDROCHLORIDE 5 MG/ML
5 INJECTION, SOLUTION INTRAVENOUS ONCE AS NEEDED
Status: DISCONTINUED | OUTPATIENT
Start: 2023-12-01 | End: 2023-12-01 | Stop reason: HOSPADM

## 2023-12-01 RX ADMIN — SUGAMMADEX 200 MG: 100 INJECTION, SOLUTION INTRAVENOUS at 11:40

## 2023-12-01 RX ADMIN — OXYCODONE HYDROCHLORIDE 10 MG: 5 TABLET ORAL at 15:11

## 2023-12-01 RX ADMIN — FENTANYL CITRATE 50 MCG: 50 INJECTION, SOLUTION INTRAMUSCULAR; INTRAVENOUS at 08:02

## 2023-12-01 RX ADMIN — DEXTROSE AND SODIUM CHLORIDE 75 ML/HR: 5; 450 INJECTION, SOLUTION INTRAVENOUS at 15:11

## 2023-12-01 RX ADMIN — AMPICILLIN SODIUM AND SULBACTAM SODIUM 3 G: 2; 1 INJECTION, POWDER, FOR SOLUTION INTRAMUSCULAR; INTRAVENOUS at 08:15

## 2023-12-01 RX ADMIN — HYDROMORPHONE HYDROCHLORIDE 0.4 MG: 1 INJECTION, SOLUTION INTRAMUSCULAR; INTRAVENOUS; SUBCUTANEOUS at 10:46

## 2023-12-01 RX ADMIN — SODIUM CHLORIDE, SODIUM LACTATE, POTASSIUM CHLORIDE, AND CALCIUM CHLORIDE: 600; 310; 30; 20 INJECTION, SOLUTION INTRAVENOUS at 07:22

## 2023-12-01 RX ADMIN — FENTANYL CITRATE 50 MCG: 50 INJECTION, SOLUTION INTRAMUSCULAR; INTRAVENOUS at 08:15

## 2023-12-01 RX ADMIN — CHLORHEXIDINE GLUCONATE 0.12% ORAL RINSE 15 ML: 1.2 LIQUID ORAL at 20:22

## 2023-12-01 RX ADMIN — ROCURONIUM BROMIDE 10 MG: 10 INJECTION INTRAVENOUS at 09:25

## 2023-12-01 RX ADMIN — ONDANSETRON 4 MG: 2 INJECTION INTRAMUSCULAR; INTRAVENOUS at 11:35

## 2023-12-01 RX ADMIN — ENOXAPARIN SODIUM 40 MG: 100 INJECTION SUBCUTANEOUS at 20:22

## 2023-12-01 RX ADMIN — PROPOFOL 20 MG: 10 INJECTION, EMULSION INTRAVENOUS at 09:05

## 2023-12-01 RX ADMIN — METHADONE HYDROCHLORIDE 5 MG: 5 TABLET ORAL at 16:46

## 2023-12-01 RX ADMIN — Medication 80 MCG: at 08:04

## 2023-12-01 RX ADMIN — ESMOLOL HYDROCHLORIDE 30 MG: 10 INJECTION, SOLUTION INTRAVENOUS at 10:45

## 2023-12-01 RX ADMIN — PROPOFOL 180 MG: 10 INJECTION, EMULSION INTRAVENOUS at 08:02

## 2023-12-01 RX ADMIN — DEXAMETHASONE SODIUM PHOSPHATE 10 MG: 4 INJECTION, SOLUTION INTRA-ARTICULAR; INTRALESIONAL; INTRAMUSCULAR; INTRAVENOUS; SOFT TISSUE at 08:15

## 2023-12-01 RX ADMIN — LIDOCAINE HYDROCHLORIDE 80 MG: 20 INJECTION INTRAVENOUS at 08:02

## 2023-12-01 RX ADMIN — ROCURONIUM BROMIDE 70 MG: 10 INJECTION INTRAVENOUS at 08:02

## 2023-12-01 RX ADMIN — ROCURONIUM BROMIDE 10 MG: 10 INJECTION INTRAVENOUS at 11:24

## 2023-12-01 RX ADMIN — HYDROMORPHONE HYDROCHLORIDE 0.6 MG: 1 INJECTION, SOLUTION INTRAMUSCULAR; INTRAVENOUS; SUBCUTANEOUS at 11:33

## 2023-12-01 RX ADMIN — ROCURONIUM BROMIDE 20 MG: 10 INJECTION INTRAVENOUS at 09:51

## 2023-12-01 RX ADMIN — SODIUM CHLORIDE, POTASSIUM CHLORIDE, SODIUM LACTATE AND CALCIUM CHLORIDE 100 ML/HR: 600; 310; 30; 20 INJECTION, SOLUTION INTRAVENOUS at 12:32

## 2023-12-01 RX ADMIN — DEXAMETHASONE SODIUM PHOSPHATE 4 MG: 10 INJECTION INTRAMUSCULAR; INTRAVENOUS at 22:44

## 2023-12-01 RX ADMIN — MIDAZOLAM HYDROCHLORIDE 2 MG: 1 INJECTION, SOLUTION INTRAMUSCULAR; INTRAVENOUS at 07:50

## 2023-12-01 RX ADMIN — CHLORHEXIDINE GLUCONATE 0.12% ORAL RINSE 15 ML: 1.2 LIQUID ORAL at 15:10

## 2023-12-01 RX ADMIN — DEXAMETHASONE SODIUM PHOSPHATE 4 MG: 10 INJECTION INTRAMUSCULAR; INTRAVENOUS at 16:47

## 2023-12-01 RX ADMIN — ROCURONIUM BROMIDE 20 MG: 10 INJECTION INTRAVENOUS at 09:05

## 2023-12-01 RX ADMIN — POLYETHYLENE GLYCOL 3350 17 G: 17 POWDER, FOR SOLUTION ORAL at 15:11

## 2023-12-01 RX ADMIN — HYDROMORPHONE HYDROCHLORIDE 0.2 MG: 1 INJECTION, SOLUTION INTRAMUSCULAR; INTRAVENOUS; SUBCUTANEOUS at 12:29

## 2023-12-01 RX ADMIN — OXYCODONE HYDROCHLORIDE 5 MG: 5 TABLET ORAL at 20:27

## 2023-12-01 RX ADMIN — SODIUM CHLORIDE 6.25 MG: 9 INJECTION, SOLUTION INTRAVENOUS at 12:04

## 2023-12-01 RX ADMIN — HYDROMORPHONE HYDROCHLORIDE 0.2 MG: 1 INJECTION, SOLUTION INTRAMUSCULAR; INTRAVENOUS; SUBCUTANEOUS at 18:58

## 2023-12-01 RX ADMIN — ATORVASTATIN CALCIUM 10 MG: 10 TABLET, FILM COATED ORAL at 20:22

## 2023-12-01 RX ADMIN — METHADONE HYDROCHLORIDE 5 MG: 5 TABLET ORAL at 20:22

## 2023-12-01 RX ADMIN — ESMOLOL HYDROCHLORIDE 30 MG: 10 INJECTION, SOLUTION INTRAVENOUS at 09:51

## 2023-12-01 SDOH — SOCIAL STABILITY: SOCIAL INSECURITY: ABUSE: ADULT

## 2023-12-01 SDOH — SOCIAL STABILITY: SOCIAL INSECURITY: DOES ANYONE TRY TO KEEP YOU FROM HAVING/CONTACTING OTHER FRIENDS OR DOING THINGS OUTSIDE YOUR HOME?: UNABLE TO ASSESS

## 2023-12-01 SDOH — SOCIAL STABILITY: SOCIAL INSECURITY: WERE YOU ABLE TO COMPLETE ALL THE BEHAVIORAL HEALTH SCREENINGS?: NO

## 2023-12-01 SDOH — SOCIAL STABILITY: SOCIAL INSECURITY: ARE YOU OR HAVE YOU BEEN THREATENED OR ABUSED PHYSICALLY, EMOTIONALLY, OR SEXUALLY BY ANYONE?: UNABLE TO ASSESS

## 2023-12-01 SDOH — SOCIAL STABILITY: SOCIAL INSECURITY: HAVE YOU HAD THOUGHTS OF HARMING ANYONE ELSE?: UNABLE TO ASSESS

## 2023-12-01 SDOH — SOCIAL STABILITY: SOCIAL INSECURITY: HAS ANYONE EVER THREATENED TO HURT YOUR FAMILY OR YOUR PETS?: UNABLE TO ASSESS

## 2023-12-01 SDOH — SOCIAL STABILITY: SOCIAL INSECURITY: DO YOU FEEL ANYONE HAS EXPLOITED OR TAKEN ADVANTAGE OF YOU FINANCIALLY OR OF YOUR PERSONAL PROPERTY?: UNABLE TO ASSESS

## 2023-12-01 SDOH — SOCIAL STABILITY: SOCIAL INSECURITY: ARE THERE ANY APPARENT SIGNS OF INJURIES/BEHAVIORS THAT COULD BE RELATED TO ABUSE/NEGLECT?: UNABLE TO ASSESS

## 2023-12-01 SDOH — HEALTH STABILITY: MENTAL HEALTH: CURRENT SMOKER: 0

## 2023-12-01 SDOH — SOCIAL STABILITY: SOCIAL INSECURITY: DO YOU FEEL UNSAFE GOING BACK TO THE PLACE WHERE YOU ARE LIVING?: UNABLE TO ASSESS

## 2023-12-01 ASSESSMENT — PAIN - FUNCTIONAL ASSESSMENT
PAIN_FUNCTIONAL_ASSESSMENT: UNABLE TO SELF-REPORT
PAIN_FUNCTIONAL_ASSESSMENT: UNABLE TO SELF-REPORT
PAIN_FUNCTIONAL_ASSESSMENT: 0-10
PAIN_FUNCTIONAL_ASSESSMENT: 0-10
PAIN_FUNCTIONAL_ASSESSMENT: UNABLE TO SELF-REPORT
PAIN_FUNCTIONAL_ASSESSMENT: 0-10
PAIN_FUNCTIONAL_ASSESSMENT: 0-10

## 2023-12-01 ASSESSMENT — ACTIVITIES OF DAILY LIVING (ADL)
BATHING: INDEPENDENT
FEEDING YOURSELF: INDEPENDENT
TOILETING: INDEPENDENT
JUDGMENT_ADEQUATE_SAFELY_COMPLETE_DAILY_ACTIVITIES: YES
ADEQUATE_TO_COMPLETE_ADL: UNABLE TO ASSESS
LACK_OF_TRANSPORTATION: NO
HEARING - LEFT EAR: FUNCTIONAL
HEARING - RIGHT EAR: FUNCTIONAL
PATIENT'S MEMORY ADEQUATE TO SAFELY COMPLETE DAILY ACTIVITIES?: YES
GROOMING: INDEPENDENT
TOILETING: INDEPENDENT
GROOMING: INDEPENDENT
WALKS IN HOME: INDEPENDENT
DRESSING YOURSELF: INDEPENDENT
BATHING: INDEPENDENT
JUDGMENT_ADEQUATE_SAFELY_COMPLETE_DAILY_ACTIVITIES: YES
FEEDING YOURSELF: INDEPENDENT
ADEQUATE_TO_COMPLETE_ADL: YES
HEARING - LEFT EAR: FUNCTIONAL
WALKS IN HOME: INDEPENDENT
PATIENT'S MEMORY ADEQUATE TO SAFELY COMPLETE DAILY ACTIVITIES?: YES
LACK_OF_TRANSPORTATION: NO
HEARING - RIGHT EAR: FUNCTIONAL
DRESSING YOURSELF: INDEPENDENT

## 2023-12-01 ASSESSMENT — COGNITIVE AND FUNCTIONAL STATUS - GENERAL
DAILY ACTIVITIY SCORE: 24
MOBILITY SCORE: 24
DAILY ACTIVITIY SCORE: 24
PATIENT BASELINE BEDBOUND: NO
MOBILITY SCORE: 24

## 2023-12-01 ASSESSMENT — PAIN SCALES - GENERAL
PAINLEVEL_OUTOF10: 3
PAINLEVEL_OUTOF10: 7
PAINLEVEL_OUTOF10: 7
PAINLEVEL_OUTOF10: 10 - WORST POSSIBLE PAIN
PAINLEVEL_OUTOF10: 6
PAINLEVEL_OUTOF10: 7

## 2023-12-01 ASSESSMENT — LIFESTYLE VARIABLES
AUDIT-C TOTAL SCORE: -1
SKIP TO QUESTIONS 9-10: 0
HOW OFTEN DO YOU HAVE A DRINK CONTAINING ALCOHOL: PATIENT DECLINED
HOW OFTEN DO YOU HAVE 6 OR MORE DRINKS ON ONE OCCASION: PATIENT DECLINED
AUDIT-C TOTAL SCORE: -1
HOW MANY STANDARD DRINKS CONTAINING ALCOHOL DO YOU HAVE ON A TYPICAL DAY: PATIENT DECLINED

## 2023-12-01 ASSESSMENT — COLUMBIA-SUICIDE SEVERITY RATING SCALE - C-SSRS
1. IN THE PAST MONTH, HAVE YOU WISHED YOU WERE DEAD OR WISHED YOU COULD GO TO SLEEP AND NOT WAKE UP?: NO
2. HAVE YOU ACTUALLY HAD ANY THOUGHTS OF KILLING YOURSELF?: NO
6. HAVE YOU EVER DONE ANYTHING, STARTED TO DO ANYTHING, OR PREPARED TO DO ANYTHING TO END YOUR LIFE?: NO

## 2023-12-01 NOTE — PROGRESS NOTES
Pharmacy Medication History Review    Sadie Bocanegra is a 37 y.o. female admitted for Cleft palate. Pharmacy reviewed the patient's uamxo-bo-qfwiajidk medications and allergies for accuracy.    The list below reflects the updated PTA list.   Comments regarding how patient may be taking medications differently can be found in the Admit Orders Activity  Prior to Admission Medications   Prescriptions Last Dose Informant Patient Reported?   ALPRAZolam (Xanax) 1 mg tablet Past Week Self Yes   Sig: Take 1 tablet (1 mg) by mouth every 6 hours if needed for anxiety.  Rx is for #30/30 days - usually takes nightly prn    atorvastatin (Lipitor) 10 mg tablet 11/30/2023 Self Yes   Sig: Take 1 tablet (10 mg) by mouth once daily at bedtime.   clonazePAM (KlonoPIN) 1 mg tablet 12/1/2023 Self Yes   Sig: Take 1 tablet (1 mg) by mouth 3 times a day.   methadone (Dolophine) 5 mg tablet 12/1/2023 Self Yes   Sig: Take 1 tablet (5 mg) by mouth 4 times a day.   norethindrone-ethin estradioL (Michelle, 28,) 0.5-35 mg-mcg tablet 12/1/2023 Self Yes   Sig: Take 1 tablet by mouth once daily.   oxyCODONE-acetaminophen (Percocet) 5-325 mg tablet 11/30/2023 Self Yes   Sig: Take 1 tablet by mouth 4 times a day.   valACYclovir (Valtrex) 1 gram tablet 12/1/2023 Self Yes   Sig: Take 1 tablet (1,000 mg) by mouth once daily.   vilazodone (Viibryd) 40 mg tablet 12/1/2023 Self Yes   Sig: Take 1 tablet (40 mg) by mouth once daily.   zolpidem (Ambien) 5 mg tablet 11/30/2023 Self Yes   Sig: Take 1 tablet (5 mg) by mouth as needed at bedtime for sleep.      Facility-Administered Medications: None        The list below reflects the updated allergy list. Please review each documented allergy for additional clarification and justification.  Allergies  Reviewed by Laura Moore RN on 12/1/2023        Severity Reactions Comments    Gabapentin Not Specified GI Upset     Latex Not Specified Other     Toradol [ketorolac] Not Specified GI Upset           M2B service not  "offered prior to surgery, please reassess prior to patient discharge if Meds to Beds is desired.     Sources:   Pt interview (knows all meds, dose, sig, and last dose taken)  Dispense [facility] and [facility]      Sandoval Chao PharmD  Transitions of Care Pharmacist    Available on Epic Chat  If no response call [phone] or Vocera \"Med Rec\"   "

## 2023-12-01 NOTE — ANESTHESIA PREPROCEDURE EVALUATION
Patient: Sadie Bocanegra    Procedure Information       Date/Time: 12/01/23 0715    Procedures:       Repair Cleft Palate - Oral PORFIRIO, need citlaly retractor and cleft palate tray from [place].      Creation Fasciocutaneous Flap Head/Neck (Bilateral)    Location: [facility] OR 06 / [facility] OR    Surgeons: Matt Naylor MD            Relevant Problems   Anesthesia (within normal limits)      Cardiovascular (within normal limits)      Endocrine (within normal limits)      GI (within normal limits)      /Renal (within normal limits)      Neuro/Psych   (+) Anxiety      Pulmonary (within normal limits)      GI/Hepatic (within normal limits)      Hematology (within normal limits)      Musculoskeletal (within normal limits)       Clinical information reviewed:   Tobacco  Allergies  Meds   Med Hx  Surg Hx  OB Status  Fam Hx  Soc   Hx        NPO Detail:  NPO/Void Status  Date of Last Liquid: 12/01/23  Time of Last Liquid: 0500  Date of Last Solid: 11/30/23  Time of Last Solid: 2100         Physical Exam    Airway  Mallampati: III  TM distance: >3 FB  Neck ROM: full     Cardiovascular   Rhythm: regular  Rate: normal     Dental    Pulmonary - normal exam     Abdominal - normal exam         Anesthesia Plan    ASA 2     general     The patient is not a current smoker.  Patient was not previously instructed to abstain from smoking on day of procedure.  Patient did not smoke on day of procedure.    intravenous induction   Postoperative administration of opioids is intended.  Trial extubation is planned.  Anesthetic plan and risks discussed with patient.  Use of blood products discussed with patient who consented to blood products.    Plan discussed with attending.

## 2023-12-01 NOTE — ANESTHESIA POSTPROCEDURE EVALUATION
Patient: Sadie Bocanegra    Procedure Summary       Date: 12/01/23 Room / Location: [facility] OR 06 / [facility] OR    Anesthesia Start: 0750 Anesthesia Stop: 1201    Procedures:       Repair Cleft Palate      Creation Fasciocutaneous Flap Head/Neck (Bilateral) Diagnosis:       Cleft palate      Velopharyngeal insufficiency, acquired      (Cleft palate [Q35.9])      (Velopharyngeal insufficiency, acquired [K13.79])    Surgeons: Matt Naylor MD Responsible Provider: Sonya Painting MD    Anesthesia Type: general ASA Status: 2            Anesthesia Type: general    Vitals Value Taken Time   /73 12/01/23 1154   Temp 37.1 12/01/23 1201   Pulse 98 12/01/23 1158   Resp 27 12/01/23 1158   SpO2 93 % 12/01/23 1158   Vitals shown include unvalidated device data.    Anesthesia Post Evaluation    Patient location during evaluation: PACU  Patient participation: complete - patient participated  Level of consciousness: awake and alert  Pain management: satisfactory to patient  Airway patency: patent  Cardiovascular status: blood pressure returned to baseline and acceptable  Respiratory status: acceptable and face mask  Hydration status: acceptable  Postoperative Nausea and Vomiting: none        No notable events documented.

## 2023-12-01 NOTE — SIGNIFICANT EVENT
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 Naylor. Sadie has a PMH of anxiety, depression, substance abuse, HSV, and hyperlipidemia. During postoperative check she was resting comfortably in bed. Oxygen saturation has remained stable @ 96% on RA     Physical Exam:  General/Constitutional: Alert, cooperative, in no acute distress.  Head: normocephalic, atraumatic  Skin: Warm and dry  Eyes: PERRLA  ENT: Nasal trumpet in place. Mucus membranes moist. No active bleeding or drainage.   Pulmonary: Breathing comfortably on room air with O2 saturations at 96%. No congestion audible with inspiration.  Cardiac: Regular rate and rhythm.      /89   Pulse 70   Temp 36.3 °C (97.3 °F) (Temporal)   Resp 18   Ht 1.626 m (5' 4\")   Wt 101 kg (221 lb 12.5 oz)   LMP 11/30/2023 (Exact Date)   SpO2 96%   BMI 38.07 kg/m²      Plan:  - Monitor overnight on regular nursing floor  - Check VS q8hrs. Keep HOB elevated.   - Full liquid diet   - No straws. Careful when using utensils to not disrupt palate incision line. No incentive spirometry  - IVF saline lock when tolerating liquid diet  - Peridex mouthwash TID and after all PO intake x 14 days   - Decadron q8 x 3 doses   - If nasal trumpet removed by patient, please keep at bedside  - Post operative pain management:              - Acetaminophen IV Q6 Scheduled              - Ibuprofen 400mg Q6 Scheduled              - Oxycodone 5mg PO Q4 PRN for mild pain- Use for first line breakthrough pain              - Oxycodone 10mg PO Q6 PRN for moderate pain              - Dilaudid .2mg IV Q4 PRN for severe pain.     -DVT Prophylaxis: SCDs/ Ambulate/ Lovenox 40   -PRN Miralax for constipation  -Continue home meds:  Hyperlipidemia: Atorvastatin 20mg once nightly  Anxiety/ Depression/ Substance Abuse:   -Methadone 5mg 4x daily  -Viibryd " 40mg once daily  HSV: Valtrex 1000mg once daily  Continue daily OCP        - Follow up with Dr. Naylor in 2 weeks (appt time to be given at discharge)     Patient seen and plan discussed with Dr. Naylor.     Samuel Arteaga PA-C  Pediatric Plastic and Reconstructive Surgery   [facility]  Pager #[phone]  [phone]  On Call Plastic Surgery team [phone] or Pager #[phone]

## 2023-12-01 NOTE — OP NOTE
Repair Cleft Palate, Creation Fasciocutaneous Flap Head/Neck (B) Operative Note     Date: 2023  OR Location: [facility] OR    Name: Sadie Bocanegra, : 1985, Age: 37 y.o., MRN: [id number], Sex: female    Diagnosis  Pre-op Diagnosis     * Cleft palate [Q35.9]     * Velopharyngeal insufficiency, acquired [K13.79] Post-op Diagnosis     * Cleft palate [Q35.9]     * Velopharyngeal insufficiency, acquired [K13.79]     Procedures  Repair of large palatal defect of hard and soft palate with von langenbeck technique (82847)  Bilateral vascularized buccal fat flaps (15733 x2)    Surgeons      * Matt Naylor - Primary    Resident/Fellow/Other Assistant:  Surgeon(s) and Role:     * Samuel Arteaga PA-C - Assisting    Samuel Arteaga PA-C served as the first surgical assist as there were no qualified residents available.     Procedure Summary  Anesthesia: General  ASA: II  Anesthesia Staff: Anesthesiologist: Sonya Painting MD  Anesthesia Resident: Chidi Bowling MD  Estimated Blood Loss: 30 mL  Intra-op Medications:   Medication Name Total Dose   chlorhexidine (Peridex) 0.12 % solution 15 mL   BUPivacaine-EPINEPHrine (Marcaine w/EPI) 0.25 %-1:200,000 injection 10 mL   thrombin (Human)-fibrinogen-aprotinin-calcium (Tisseel) 4 mL 4 mL   sodium chloride 0.9 % irrigation solution 2,000 mL              Anesthesia Record               Intraprocedure I/O Totals          Intake    LR 1000.00 mL    Propofol Drip 20.00 mL    The total shown is the total volume documented since Anesthesia Start was filed.    Total Intake 1020 mL          Specimen: No specimens collected     Staff:   Circulator: Laura Moore RN  Relief Circulator: Samanta Duffy RN  Scrub Person: Hyacinth Capellan; Alyssa Mora     Drains and/or Catheters: * None in log *    Findings: Large palatal defect posteriorly involve the entire soft palate and small portion of the hard palate.    Indications: Sadie Bocanegra is an 37 y.o. female who is having  surgery for Cleft palate [Q35.9]  Velopharyngeal insufficiency, acquired [K13.79].  This morning was previously seen in clinic and found to have a large palatal defect.  This occurred temporally related to her history of hip replacement surgery over 1 year ago.  He has resulted in significant hypernasal speech and velopharyngeal insufficiency.  She has underwent extensive work-up from multiple providers including rheumatologist there is no clear underlying etiology.  She has a questionable history of snorting acetaminophen but denies any illicit drug use.  We have previously discussed palate repair using local flaps and buccal fat flaps.  She now presents for this planned procedure.    The patient was seen in the preoperative area. The risks, benefits, complications, treatment options, non-operative alternatives, expected recovery and outcomes were discussed with the patient. The possibilities of reaction to medication, pulmonary aspiration, injury to surrounding structures, bleeding, recurrent infection, palatal fistula, velopharyngeal insufficiency, persistent hypernasality, hematoma, seroma, bleeding, wound dehiscence, flap necrosis, injury to nearby structures, and the need for additional procedures, failure to diagnose a condition, and creating a complication requiring transfusion or operation were discussed with the patient. The patient concurred with the proposed plan, giving informed consent.  The site of surgery was properly noted/marked if necessary per policy. The patient has been actively warmed in preoperative area. Preoperative antibiotics have been ordered and given within 1 hours of incision. Venous thrombosis prophylaxis have been ordered including bilateral sequential compression devices    Procedure Details:    The patient was subsequently brought to the operating room and placed supine on the operating room table.  All bony prominences were well padded.  A time out was performed verifying patient  by name, age birth date, medical record number, procedure, and laterality of procedure to be performed.  Following this general anesthesia was then induced by the anesthesiology team. The HOB was then turned 90° anesthesia team.   The face was then prepped and draped in usual aseptic fashion.   Of note, the patient has a large palatal defect involving majority of the soft palate and a small portion of the posterior hard palate.    The face was then prepped and draped in the usual sterile fashion. A Romel mouth gag was then placed to allow us to fully evaluate the underlying degree of clefting of the palate. We then marked our planned palate repair along the cleft margin extending up to the hard palate with a small triangular flap anteriorly onto the hard palate and bilateral relaxing incisions around the retromolar trigone.  Of note, there was significant degree of scarring from the previous injury process to the palate.  Local anesthetic was then injected after we had marked the cleft repair which was done with 0.25% bupivacaine with epinephrine.  This was done in a subperiosteal plane to hydro dissect the planned area of dissection.  Adequate time was allowed for hemostatic and anesthetic effect.     We initiated procedure by making sharp incision with an 11 blade posteriorly through the uvula and then 15 blade scalpel along the left-sided cleft margin extending anteriorly to the hard palate and along the planned triangular flap.  I also made the lateral backcut releasing incision with a 15 blade.  A periosteal elevator was then used to elevate the sub mucoperiosteal plane.  After adequate space was made for the Rigo elevator, it was then placed in to fulcrum the palatal flap upward and connect our lateral incision to our medial incision at the cleft margin.  Sub mucoperiosteal dissection was then propagated posteriorly until we identified our vascular pedicle exiting the greater palatine foramina.  With the  vessel retracted and protected we then performed additional dissection with Bovie electrocautery within the lateral sulcus in the retromolar region.  Tenotomy scissors and francia elevator were then used to delaminated the oral mucosa away from the underlying muscle within the soft palate.  It was noted that the majority of the muscle was absent and is just significant scarring.  I then sharply delaminated a oral layer and a nasal layer for closure of the soft palate. In order to allow midline closure of the nasal layer, additional dissection and release of the nasal mucosa from the pterygoid fossa towards the skull base was then performed with a combination of periosteal elevator and dental curette elevation allowing the nasal mucosa to be medialized.       We then directed our attention toward the contralateral side where in a similar fashion we circumferentially incised our soft palate and hard palate at the cleft margins.  Laterally relaxing incisions were made.  Subperiosteally dissection was then performed using multiple periosteal elevators.  The vascular pedicle greater palatine vessel was maintained and preserved.  The oral and nasal layers were delaminated sharply again.  There is a small hole made on the nasal leg which was repaired using 4-0 Vicryl sutures.  Similar dissection down towards the medial pterygoid plate in order to release the nasal mucosal advancement medially was performed as well.    After completion of adequate release of the palatal flaps for both oral and nasal layer closure, I then achieved hemostasis using Floseal and Bovie cautery.  I then began nasal layer closure using multiple 4-0 Vicryl sutures.  Anteriorly, the triangular flap was elevated off the hard palate was then inset posteriorly to assist with closure right at the hard soft palate junction.    There was significant tension at the hard-soft junction of the nasal mucosa. Therefore, the decision was made to elevate  bilateral buccal fat flap.  Scissors were used to make a blunt dissection through the lateral buccal mucosa and buccinator muscle.  Suction was placed into the pocket with easy return of buccal fat.  Two Debakey forceps were then used to gently teased the investing fascial layer of the buccal fat allowing mobilization of the buccal fat tissue into the field.  A right angle was then used to open a pocket behind the left-sided pedicle to the Bardach flaps through which the buccal fat flap was delivered.  The buccal fat flap was based on a branch of the buccal artery. A right sided buccal flap was elevated in a similar manner and raised based on a branch of the buccal artery. The two flaps were then inset to each other over the area of tension near the junction of the hard and soft palate to provide additional vascularized tissue to fill the dead space left by retropositioning of the levator muscle and provide reinforcement of the repair. Tissue fibrin glue was then used to further secure the fat flaps.     With a nasal layer closure and bilateral buccal flap elevation and inset completed, I then turned attention to the oral layer closure. We then proceeded with oral side closure with additional 4-0 Vicryl suture from distal to mesial.  However, the buccal fat flap had provided us with an additional layer for a three layer closure in this region. Fibrin glue was injected along the lateral releasing incisions for hemostasis.  Following this the Romel mouth gag was removed and a 32 Fr nasal trumpet was inserted and taped to her cheeks. An NG tube was used to decompressed the abdomen.     At the completion of the procedure all needle instrument and sponge counts were correct x2.  The patient was returned to anesthesia for emergence and extubation and transferred to the recovery area in stable condition.  There were no apparent complications.      Complications:  None; patient tolerated the procedure well.     Disposition: PACU - hemodynamically stable.  Condition: stable         Additional Details: none    Attending Attestation: I was present and scrubbed for the entire procedure.    Matt Naylor  Phone Number: [phone]

## 2023-12-01 NOTE — HOSPITAL COURSE
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 Naylor of Plastic Surgery.     HOSPITAL COURSE:    Patient was admitted to the plastic surgery service post operatively for close monitoring and pain control. She completed x3 doses IV decadron post operatively. ***     CONSULTATIONS:  *** consulted for *** and elected to ***    DAY OF DISCHARGE:    On the day of discharge, the patient was seen and evaluated by the Plastic Surgery team and deemed suitable for discharge.  There were no significant events overnight. Vitals were reviewed and within normal limits. Labs were stable at discharge. On day of discharge the patient was tolerating a diet, pain was controlled on PO pain medication, was ambulating well and voiding spontaneously. The patient was given detailed discharge instructions and were scheduled to follow up as an outpatient.

## 2023-12-01 NOTE — BRIEF OP NOTE
Date: 2023  OR Location: [facility] OR    Name: Sadie Bocanegra, : 1985, Age: 37 y.o., MRN: [id number], Sex: female    Diagnosis  Pre-op Diagnosis     * Cleft palate [Q35.9]     * Velopharyngeal insufficiency, acquired [K13.79] Post-op Diagnosis     * Cleft palate [Q35.9]     * Velopharyngeal insufficiency, acquired [K13.79]     Procedures  Repair of large palatal defect  Bilateral vascularized buccal fat flaps      Surgeons      * Matt Naylor - Primary    Resident/Fellow/Other Assistant:  Surgeon(s) and Role:     * Samuel Arteaga PA-C - Assisting    Procedure Summary  Anesthesia: General  ASA: II  Anesthesia Staff: Anesthesiologist: Sonya Painting MD  Anesthesia Resident: Chidi Bowling MD  Estimated Blood Loss: 30mL  Intra-op Medications:   Medication Name Total Dose   chlorhexidine (Peridex) 0.12 % solution 15 mL   BUPivacaine-EPINEPHrine (Marcaine w/EPI) 0.25 %-1:200,000 injection 10 mL   thrombin (Human)-fibrinogen-aprotinin-calcium (Tisseel) 4 mL 4 mL   sodium chloride 0.9 % irrigation solution 2,000 mL              Anesthesia Record               Intraprocedure I/O Totals          Intake    LR 1000.00 mL    Propofol Drip 20.00 mL    The total shown is the total volume documented since Anesthesia Start was filed.    Total Intake 1020 mL          Specimen: No specimens collected     Staff:   Circulator: Laura Moore RN  Relief Circulator: Samanta Duffy RN  Scrub Person: Hyacinth Capellan; Alyssa Mora          Findings: Large palatal defect    Complications:  None; patient tolerated the procedure well.     Disposition: PACU - hemodynamically stable.  Condition: stable  Specimens Collected: No specimens collected

## 2023-12-01 NOTE — CARE PLAN
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 Naylor. Sadie has a PMH of anxiety, depression, substance abuse, HSV, and hyperlipidemia. During postoperative check she was resting comfortably in bed. Oxygen saturation has remained stable @ 96% on RA    Physical Exam:  General/Constitutional: Alert, cooperative, in no acute distress.  Head: normocephalic, atraumatic  Skin: Warm and dry  Eyes: PERRLA  ENT: Nasal trumpet in place. Mucus membranes moist. No active bleeding or drainage.   Pulmonary: Breathing comfortably on room air with O2 saturations at 96%. No congestion audible with inspiration.  Cardiac: Regular rate and rhythm.     /89   Pulse 70   Temp 36.3 °C (97.3 °F) (Temporal)   Resp 18   Ht 1.626 m (5' 4\")   Wt 101 kg (221 lb 12.5 oz)   LMP 11/30/2023 (Exact Date)   SpO2 96%   BMI 38.07 kg/m²     Plan:  - Monitor overnight on regular nursing floor  - Check VS q8hrs. Keep HOB elevated.   - Full liquid diet   - No straws. Careful when using utensils to not disrupt palate incision line. No incentive spirometry  - IVF saline lock when tolerating liquid diet  - Peridex mouthwash TID and after all PO intake x 14 days   - Decadron q8 x 3 doses   - If nasal trumpet removed by patient, please keep at bedside  - Post operative pain management:   - Acetaminophen IV Q6 Scheduled   - Ibuprofen 400mg Q6 Scheduled   - Oxycodone 5mg PO Q4 PRN for mild pain- Use for first line breakthrough pain   - Oxycodone 10mg PO Q6 PRN for moderate pain   - Dilaudid .2mg IV Q4 PRN for severe pain.    -DVT Prophylaxis: SCDs/ Ambulate/ Lovenox 40   -PRN Miralax for constipation  -Continue home meds:  Hyperlipidemia: Atorvastatin 20mg once nightly  Anxiety/ Depression/ Substance Abuse:   -Methadone 5mg 4x daily  -Viibryd 40mg once daily  HSV: Valtrex 1000mg once daily  Continue " daily OCP      - Follow up with Dr. Naylor in 2 weeks (appt time to be given at discharge)    Patient seen and plan discussed with Dr. Naylor.    Samuel Arteaga PA-C  Pediatric Plastic and Reconstructive Surgery   [place]  Pager #[phone]  [phone]  On Call Plastic Surgery team [phone] or Pager #[phone]     Detail Level: Detailed Quality 130: Documentation Of Current Medications In The Medical Record: Current Medications Documented

## 2023-12-01 NOTE — DISCHARGE INSTRUCTIONS
Plastic Surgery Post Discharge Instructions  You were admitted to [facility] for postoperative monitoring and control of pain following palatal repair with bilateral buccal fat flaps on 12/1/2023 with Dr. Naylor of plastic surgery. Included below are post-discharge instructions and details regarding follow-up.     Thank you for allowing us to participate in your care and we wish you the best!    Best Regards,  [facility]  Department of Plastic and Reconstructive Surgery    Surgical Site/Wound Care:  Continue use of Peridex mouth rinses at least three times a day and with meals for next 2 weeks. Avoid straws, be cautious with utensils to prevent disruption to palatal incision line.     Continue to monitor flap for changes in general appearance, color, temperature and turgor. Please additionally monitor for any developing signs of infection which may include increased redness, swelling, fever/chills, green/yellow drainage, or foul odor from surgical sites or wounds. If any signs of infection or changes in flap appearance are to occur, please immediately contact the plastic surgery office.     Nutrition:  Continue full liquid diet for two weeks, then transition to soft diet until follow up with plastic surgery. Ensure that you are drinking an adequate amount of fluids to maintain hydration.     Medication Instructions:  You may resume use of your home prescribed mediations as previously directed following discharge from the hospital. If you were taking medications prior to your surgery and they are not listed on your discharge homegoing instructions medications list, consult your MD before you resume these medications.    Some postoperative pain is not unusual. This is usually relieved by taking prescribed or over the counter Acetaminophen/Tylenol, Motrin/ibuprofen. We have prescribed ibuprofen for you to take every 6 hours for the next 3 days. Please take this with  food as it may upset your stomach. Please continue your home Oxycodone regime as prescribed for you by pain management team. Please see your pain management doctor as soon as possible after discharge for pain control. Severe pain despite administration of pain medication must be reported to your physician.    Remember when taking Acetaminophen, do NOT exceed more than 1000 milligrams (mg) per dose or more than 4000 mg total per day. Taking too much Acetaminophen at one time can damage your liver. The maximum amount of ibuprofen in adults is 800 mg per dose or 3200 mg per day. Call your MD if you have any questions about your medications. To prevent constipation while taking narcotic pain medications, please utilize your prescribed bowel regimen, ensure that you drink plenty of water, eat fiber rich foods (a good source is fruit) and increase activity progressively.    DO NOT drive a car while utilizing narcotic pain medications and until cleared by MD at follow-up appointment. Driving or operating heavy machinery, lawnmowers or power tools while taking opiod/narcotic pain medications may impair your judgement.    Call Physician If:  Contact the plastic surgery office for any questions and/or concerns regarding the surgical incision/site.  1. [phone] if Monday-Friday (8 a.m. - 4:30 p.m.)  2. [phone] and ask for the Plastic Surgery team on call provider if after hours or on weekends  3. Email [email or web address] for any non-urgent concerns    Call your MD or seek immediate medical attention if you experience any of the following symptoms:  1. Fever of 101.5 (38.5 C) or greater  2. Pain not controlled with prescribed pain medications  3. Uncontrolled nausea and/or vomiting  4. Drainage or swelling around your incisions and/or surgical sites   5. Separation of incisions, or tearing of the incision line  6. Large fluid collection under or around the incision or flap sites   7. Flap discoloration  (including darkened appearance)  8. Difficulty breathing  9. Swelling, pain, heat and/or redness in your legs and/or calves  10. Inability to tolerate diet/fluid intake      Follow-up/Post Discharge Appointments:  Follow-up care is a key part of your treatment and safety. It is very important that you maintain follow-up care as directed so that your surgical site heals properly and does not lead to problems. Always carry a current medication list with you and bring it to ALL healthcare Provider visits. Be sure to maintain follow up with plastic surgery at your scheduled appointment. If you are unable to keep your appointment, or need to reschedule please contact our office at [phone].

## 2023-12-02 VITALS
RESPIRATION RATE: 18 BRPM | DIASTOLIC BLOOD PRESSURE: 73 MMHG | SYSTOLIC BLOOD PRESSURE: 132 MMHG | BODY MASS INDEX: 37.86 KG/M2 | HEIGHT: 64 IN | HEART RATE: 66 BPM | OXYGEN SATURATION: 96 % | TEMPERATURE: 98.2 F | WEIGHT: 221.78 LBS

## 2023-12-02 PROCEDURE — 2500000004 HC RX 250 GENERAL PHARMACY W/ HCPCS (ALT 636 FOR OP/ED): Mod: SE | Performed by: STUDENT IN AN ORGANIZED HEALTH CARE EDUCATION/TRAINING PROGRAM

## 2023-12-02 PROCEDURE — 2500000001 HC RX 250 WO HCPCS SELF ADMINISTERED DRUGS (ALT 637 FOR MEDICARE OP): Mod: SE | Performed by: STUDENT IN AN ORGANIZED HEALTH CARE EDUCATION/TRAINING PROGRAM

## 2023-12-02 PROCEDURE — 2500000002 HC RX 250 W HCPCS SELF ADMINISTERED DRUGS (ALT 637 FOR MEDICARE OP, ALT 636 FOR OP/ED): Mod: SE | Performed by: STUDENT IN AN ORGANIZED HEALTH CARE EDUCATION/TRAINING PROGRAM

## 2023-12-02 PROCEDURE — 99231 SBSQ HOSP IP/OBS SF/LOW 25: CPT | Performed by: PHYSICIAN ASSISTANT

## 2023-12-02 PROCEDURE — S0109 METHADONE ORAL 5MG: HCPCS | Mod: SE | Performed by: STUDENT IN AN ORGANIZED HEALTH CARE EDUCATION/TRAINING PROGRAM

## 2023-12-02 PROCEDURE — 7100000011 HC EXTENDED STAY RECOVERY HOURLY - NURSING UNIT

## 2023-12-02 RX ORDER — CHLORHEXIDINE GLUCONATE ORAL RINSE 1.2 MG/ML
15 SOLUTION DENTAL 3 TIMES DAILY
Qty: 473 ML | Refills: 1 | Status: CANCELLED | OUTPATIENT
Start: 2023-12-02

## 2023-12-02 RX ORDER — IBUPROFEN 600 MG/1
600 TABLET ORAL EVERY 6 HOURS SCHEDULED
Qty: 12 TABLET | Refills: 0 | Status: SHIPPED | OUTPATIENT
Start: 2023-12-02 | End: 2023-12-05

## 2023-12-02 RX ORDER — CHLORHEXIDINE GLUCONATE ORAL RINSE 1.2 MG/ML
15 SOLUTION DENTAL 3 TIMES DAILY
Qty: 630 ML | Refills: 0 | Status: SHIPPED | OUTPATIENT
Start: 2023-12-02 | End: 2024-01-03 | Stop reason: SDUPTHER

## 2023-12-02 RX ADMIN — DEXTROSE AND SODIUM CHLORIDE 75 ML/HR: 5; 450 INJECTION, SOLUTION INTRAVENOUS at 06:58

## 2023-12-02 RX ADMIN — CHLORHEXIDINE GLUCONATE 0.12% ORAL RINSE 15 ML: 1.2 LIQUID ORAL at 09:00

## 2023-12-02 RX ADMIN — METHADONE HYDROCHLORIDE 5 MG: 5 TABLET ORAL at 06:59

## 2023-12-02 RX ADMIN — VALACYCLOVIR HYDROCHLORIDE 1000 MG: 500 TABLET, FILM COATED ORAL at 09:00

## 2023-12-02 RX ADMIN — OXYCODONE HYDROCHLORIDE 5 MG: 5 TABLET ORAL at 09:01

## 2023-12-02 RX ADMIN — IBUPROFEN 400 MG: 400 TABLET, FILM COATED ORAL at 06:58

## 2023-12-02 RX ADMIN — POLYETHYLENE GLYCOL 3350 17 G: 17 POWDER, FOR SOLUTION ORAL at 09:00

## 2023-12-02 RX ADMIN — DEXAMETHASONE SODIUM PHOSPHATE 4 MG: 10 INJECTION INTRAMUSCULAR; INTRAVENOUS at 06:59

## 2023-12-02 RX ADMIN — VILAZODONE HYDROCHLORIDE 40 MG: 40 TABLET, FILM COATED ORAL at 09:00

## 2023-12-02 ASSESSMENT — PAIN SCALES - GENERAL
PAINLEVEL_OUTOF10: 5 - MODERATE PAIN
PAINLEVEL_OUTOF10: 2

## 2023-12-02 ASSESSMENT — PAIN - FUNCTIONAL ASSESSMENT
PAIN_FUNCTIONAL_ASSESSMENT: 0-10
PAIN_FUNCTIONAL_ASSESSMENT: 0-10

## 2023-12-02 NOTE — DISCHARGE SUMMARY
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 Naylor of Plastic Surgery.     HOSPITAL COURSE:    Patient was admitted to the plastic surgery service post operatively for close monitoring and pain control. She completed x3 doses IV decadron post operatively. Her pain is well controlled and she is taking her full liquid diet.    DAY OF DISCHARGE:    On the day of discharge, the patient was seen and evaluated by the Plastic Surgery team and deemed suitable for discharge.  There were no significant events overnight. Vitals were reviewed and within normal limits. Labs were stable at discharge. On day of discharge the patient was tolerating a full liquid diet, pain was controlled on PO pain medication, was ambulating well and voiding spontaneously. The patient was given detailed discharge instructions and were scheduled to follow up in four weeks as an outpatient. Discussed with patient to follow up with pain management team as soon as possible for pain control.     Pertinent Physical Exam At Time of Discharge  Physical Exam:  General/Constitutional: Alert, cooperative, in no acute distress.  Head: normocephalic, atraumatic  Skin: Warm and dry  Eyes: PERRLA  ENT: Nasal trumpet removed. Mucus membranes moist. No active bleeding or drainage.   Pulmonary: Breathing comfortably on room air. No congestion audible with inspiration.  Cardiac: Regular rate and rhythm.     Home Medications     Medication List      START taking these medications     chlorhexidine 0.12 % solution; Commonly known as: Peridex; Use 15 mL in   the mouth or throat 3 times a day for 14 days.   ibuprofen 600 mg tablet; Take 1 tablet (600 mg) by mouth every 6  hours   for 3 days.     CONTINUE home medications as prescribed prior to hospital admission.     Outpatient Follow-Up  Follow up with Dr. Matt Naylor in 4 weeks.   Follow up with Pain management doctor as soon as possible.     Dawna Strong PA-C

## 2023-12-02 NOTE — CARE PLAN
The patient's goals for the shift include Pain control.    The clinical goals for the shift include Patient will maintain a patent airway.    Over the shift, the patient did  make progress towards her goals. Patient was discharged home without homecare. RN removed patient's peripheral IVs prior to discharge. RN went over discharge instruction with patient and patient's family member. Patient did not have any questions/comments regarding discharge instructions. Patient refused transport. Patient left via independent accompanied by her family member with her belongings.

## 2023-12-22 ENCOUNTER — TELEPHONE (OUTPATIENT)
Dept: PLASTIC SURGERY | Facility: HOSPITAL | Age: 38
End: 2023-12-22
Payer: COMMERCIAL

## 2023-12-22 DIAGNOSIS — Q35.9 CLEFT PALATE (HHS-HCC): Primary | ICD-10-CM

## 2023-12-22 RX ORDER — AMOXICILLIN AND CLAVULANATE POTASSIUM 875; 125 MG/1; MG/1
1 TABLET, FILM COATED ORAL 2 TIMES DAILY
Qty: 20 TABLET | Refills: 0 | Status: SHIPPED | OUTPATIENT
Start: 2023-12-22 | End: 2024-01-01

## 2024-01-03 ENCOUNTER — OFFICE VISIT (OUTPATIENT)
Dept: PLASTIC SURGERY | Facility: HOSPITAL | Age: 39
End: 2024-01-03
Payer: COMMERCIAL

## 2024-01-03 ENCOUNTER — APPOINTMENT (OUTPATIENT)
Dept: PLASTIC SURGERY | Facility: HOSPITAL | Age: 39
End: 2024-01-03
Payer: COMMERCIAL

## 2024-01-03 VITALS
WEIGHT: 217.15 LBS | DIASTOLIC BLOOD PRESSURE: 85 MMHG | RESPIRATION RATE: 20 BRPM | BODY MASS INDEX: 37.27 KG/M2 | OXYGEN SATURATION: 99 % | TEMPERATURE: 97.9 F | HEART RATE: 91 BPM | SYSTOLIC BLOOD PRESSURE: 124 MMHG

## 2024-01-03 DIAGNOSIS — Q35.9 CLEFT PALATE (HHS-HCC): Primary | ICD-10-CM

## 2024-01-03 DIAGNOSIS — K13.79: ICD-10-CM

## 2024-01-03 DIAGNOSIS — Q35.9 CLEFT PALATE (HHS-HCC): ICD-10-CM

## 2024-01-03 PROCEDURE — 1036F TOBACCO NON-USER: CPT | Performed by: SURGERY

## 2024-01-03 PROCEDURE — 99024 POSTOP FOLLOW-UP VISIT: CPT | Performed by: SURGERY

## 2024-01-03 RX ORDER — CHLORHEXIDINE GLUCONATE ORAL RINSE 1.2 MG/ML
15 SOLUTION DENTAL 3 TIMES DAILY
Qty: 1350 ML | Refills: 0 | Status: SHIPPED | OUTPATIENT
Start: 2024-01-03 | End: 2024-02-02

## 2024-01-03 NOTE — TELEPHONE ENCOUNTER
"Patient states \"the stitches ripped when she was getting sick two nights ago, from what must of been a 24 hr GI bug\". Since the vomiting episode patient notes increased pain and tenderness to her palate with increased mucous production/drainage. A 10 day course of Augmentin script was sent to patients pharmacy to prevent any infection to her cleft palate repair.   "

## 2024-01-03 NOTE — PROGRESS NOTES
Clinic Note    Reason For Visit  Soft palate defect    History Of Present Illness  Sadie Bocanegra is a 38 y.o. female who had a large soft palate defect and velopharyngeal insufficiency.  Additionally she was missing a significant portion of her bony nasal septum due to unclear etiology. She has been seen by multiple other providers and denies any inciting trauma.  She has not been able to work due to the significant hypernasality and inability for other people to understand her.      We had discussed the option of palatal obturator but patient preferred a surgical attempt first.  Therefore, she underwent palatoplasty which was performed on 12/1/2023 and now presents for her first postoperative appointment.    She reports that she initially did very well after surgery and had no issues and was healing appropriately.  However on 12/20/2023 she had significant GI symptoms leading to violent vomiting. at the time, she she reports that she felt a pop of the stitch.  Next day, she did notice development of a soft palate fistula which then progressively enlarged in size.  She denies any significant pain or discomfort in the area.  She had reach out to our office at that time and was started on a course of oral antibiotics as a prophylaxis to minimize the influence of any infectious etiologies.  Otherwise she has also been using Peridex mouthwash and eating a soft diet.  She does report continued to notice similar hypernasality as before.    Past Medical History  She has a past medical history of Anxiety, Cleft palate, Herpes, Hyperlipidemia, and Velopharyngeal insufficiency, acquired.  Anxiety, right hip degenerative disease, hyperlipidemia    Surgical History  She has a past surgical history that includes Hip Arthroplasty (Left, 02/2022); Carpal tunnel release (Bilateral, 2019); Cervical biopsy; and Lymph node biopsy.  Right hip replacement, bilateral carpal tunnel release     Social History  She reports that she quit  smoking about 2 months ago. Her smoking use included cigarettes. She has never used smokeless tobacco. She reports that she does not currently use alcohol. She reports current drug use. Drug: Marijuana.  She smokes half pack of cigarettes per day  She denies any alcohol use or illicit drug use other than marijuana  She is currently unemployed but used to work as a     Allergies  Gabapentin, Latex, and Toradol [ketorolac]    Review of Systems  Negative other than what is included in the HPI.      Physical Exam  On exam, Sadie Bocanegra is well-appearing and well-developed.  she is breathing comfortably on room air and is in no distress.  Focused examination of Her affected region reveals:     There does appear to be dehiscence of the majority of the repair with a small anterior fistula and a large posterior soft palate dehiscence.  The intermediate part of the repair remains intact.    Significant hypernasality and velopharyngeal insufficiency.     Relevant Results      Assessment/Plan     Sadie Bocanegra is a 38 y.o. female with large palatal defect resulting in hypernasality, nasal regurgitation.  She is now nearly 1 month status post palate repair using local flaps.  Unfortunately she did develop dehiscence of the repair likely related to her recent illness episode with severe vomiting.  Today I discussed with patient that I would advise against any immediate surgical intervention but rather we should allow the area which is very inflamed to heal and the scar to mature.  I would recommend continue Peridex mouthwash for 4 more weeks to keep area clean and improve hygiene.  She is also stay on soft diet in the meantime.  I will plan to see her in 4 weeks.  We did discuss that in palatal obturator may be the best option for speech improvement and the patient is willing to discuss with our orthodontic group this option.  We will provide a contact formation and make the referral.  Otherwise I look forward  to seeing her in 4 weeks.      Matt Naylor MD

## 2024-01-31 ENCOUNTER — OFFICE VISIT (OUTPATIENT)
Dept: PLASTIC SURGERY | Facility: HOSPITAL | Age: 39
End: 2024-01-31
Payer: COMMERCIAL

## 2024-01-31 VITALS
TEMPERATURE: 97.8 F | WEIGHT: 222.2 LBS | OXYGEN SATURATION: 100 % | BODY MASS INDEX: 38.14 KG/M2 | HEART RATE: 97 BPM | DIASTOLIC BLOOD PRESSURE: 78 MMHG | RESPIRATION RATE: 20 BRPM | SYSTOLIC BLOOD PRESSURE: 113 MMHG

## 2024-01-31 DIAGNOSIS — R49.21 HYPERNASAL SPEECH: ICD-10-CM

## 2024-01-31 DIAGNOSIS — Q35.9 CLEFT PALATE (HHS-HCC): ICD-10-CM

## 2024-01-31 DIAGNOSIS — K13.79: Primary | ICD-10-CM

## 2024-01-31 PROCEDURE — 1036F TOBACCO NON-USER: CPT | Performed by: SURGERY

## 2024-01-31 PROCEDURE — 99024 POSTOP FOLLOW-UP VISIT: CPT | Performed by: SURGERY

## 2024-01-31 NOTE — PROGRESS NOTES
Clinic Note    Reason For Visit  palatal defect    History Of Present Illness  Sadie Bocanegra is a 38 y.o. female who had a large palatal defect and velopharyngeal insufficiency.  Additionally she was missing a significant portion of her bony nasal septum due to unclear etiology. She has been seen by multiple other providers and denies any inciting trauma or previous drug use.  She has not been able to work due to the significant hypernasality and inability for other people to understand her.      We had discussed the option of palatal obturator but patient preferred a surgical attempt first.  Therefore, she underwent palatoplasty which was performed on 12/1/2023 and now presents for another postoperative appointment.    She did have an episode of violent vomiting likely due to a gastrointestinal bug shortly after surgery and developed palatal dehiscence.  She now has a small fistula near the hard soft junction and partial dehiscence of the soft palate repair.  She reports overall pain is much improved only has ache in the area.  She does give food stuck in the fistula which is very bothersome.  We have referred her to the case craniofacial orthodontic team to discuss obturator as an option to improve her speech as she still has persistent velopharyngeal insufficiency.      Past Medical History  She has a past medical history of Anxiety, Cleft palate, Herpes, Hyperlipidemia, and Velopharyngeal insufficiency, acquired.  Anxiety, right hip degenerative disease, hyperlipidemia    Surgical History  She has a past surgical history that includes Hip Arthroplasty (Left, 02/2022); Carpal tunnel release (Bilateral, 2019); Cervical biopsy; and Lymph node biopsy.  Right hip replacement, bilateral carpal tunnel release     Social History  She reports that she quit smoking about 3 months ago. Her smoking use included cigarettes. She has never used smokeless tobacco. She reports that she does not currently use alcohol. She  reports current drug use. Drug: Marijuana.  She smokes half pack of cigarettes per day  She denies any alcohol use or illicit drug use other than marijuana  She is currently unemployed but used to work as a     Allergies  Gabapentin, Latex, and Toradol [ketorolac]    Review of Systems  Negative other than what is included in the HPI.      Physical Exam  On exam, Sadie Bocanegra is well-appearing and well-developed.  she is breathing comfortably on room air and is in no distress.  Focused examination of Her affected region reveals:     There does appear to be a small anterior fistula and a large posterior soft palate dehiscence.  The intermediate part of the repair remains intact.  The size of the fistula remained stable in the intra portion of the soft palate at intact has remained intact.    Significant hypernasality and velopharyngeal insufficiency but slightly improved compared to prior to surgery.     Relevant Results      Assessment/Plan     Sadie Bocanegra is a 38 y.o. female with large palatal defect resulting in hypernasality, nasal regurgitation.  She is now nearly 2 months status post palate repair using local flaps.  Unfortunately she did develop dehiscence of the repair likely related to and illness with episodes of severe vomiting.  Today I discussed with the patient that her palatal fistula remained stable and has not enlarged.  I encouraged her to trial the obturator for improvement in the hypernasality.  Otherwise, she can stop the medicated mouthwash and transition to over-the-counter mouthwash.  She can resume a regular diet.  In terms of any future palatal revision, I recommend waiting at least 1 year to see how much healing and contraction can take place on his own.  I look forward to seeing her in 3 months for another follow-up visit.      Matt Naylor MD

## 2024-02-28 ENCOUNTER — DOCUMENTATION (OUTPATIENT)
Dept: PLASTIC SURGERY | Facility: HOSPITAL | Age: 39
End: 2024-02-28
Payer: COMMERCIAL

## 2024-02-28 NOTE — PROGRESS NOTES
Conference Notes  Craniofacial/Orthodontics Conference:      Sadie Bocanegra is a 38 y.o. female who had a large palatal defect and velopharyngeal insufficiency.  Additionally she was missing a significant portion of her bony nasal septum due to unclear etiology. She has been seen by multiple other providers and denies any inciting trauma or previous drug use.  She has not been able to work due to the significant hypernasality and inability for other people to understand her.  We had discussed the option of palatal obturator but patient preferred a surgical attempt first.  Therefore, she underwent palatoplasty which was performed on 12/1/2023.      Orthodontia: Essix retainer was done for her in the craniofacial department as temporary appliance to help the patient for couple of weeks; it doesn't work  -Discussed the case with Dr. GOMEZ and Dr. Mace  -Dr. Mace, agreed to do palatal obturator.     Plastics: Will continue to follow postoperatively.

## 2024-10-21 ENCOUNTER — APPOINTMENT (OUTPATIENT)
Dept: PLASTIC SURGERY | Facility: CLINIC | Age: 39
End: 2024-10-21
Payer: COMMERCIAL

## (undated) DEVICE — FLOSEAL, MATRIX, HEMOSTATIC, FULL STERILE PREP, 5ML

## (undated) DEVICE — TRAY, MINOR, SINGLE BASIN, STERILE

## (undated) DEVICE — MANIFOLD, 4 PORT NEPTUNE STANDARD

## (undated) DEVICE — SUTURE, VICRYL, 4-0, 27 IN, RB-1, VIOLET

## (undated) DEVICE — Device

## (undated) DEVICE — CORD, BIPOLAR,  12 FT, DISPOSABLE, LF

## (undated) DEVICE — COVER, CART, 45 X 27 X 48 IN, CLEAR

## (undated) DEVICE — SEALANT, HEMOSTATIC, FLOSEAL, 10 ML

## (undated) DEVICE — DRAPE, INSTRUMENT, W/POUCH, STERI DRAPE, 7 X 11 IN, DISPOSABLE, STERILE

## (undated) DEVICE — SUTURE, VICRYL, 4-0, 27 IN, TF, UNDYED

## (undated) DEVICE — PREP TRAY, SKIN, DRY, W/GLOVES

## (undated) DEVICE — SUTURE, VICRYL, 3-0, 27IN, RB-1